# Patient Record
Sex: MALE | Race: WHITE | NOT HISPANIC OR LATINO
[De-identification: names, ages, dates, MRNs, and addresses within clinical notes are randomized per-mention and may not be internally consistent; named-entity substitution may affect disease eponyms.]

---

## 2017-01-23 ENCOUNTER — APPOINTMENT (OUTPATIENT)
Dept: ORTHOPEDIC SURGERY | Facility: CLINIC | Age: 57
End: 2017-01-23

## 2017-02-06 ENCOUNTER — APPOINTMENT (OUTPATIENT)
Dept: ORTHOPEDIC SURGERY | Facility: CLINIC | Age: 57
End: 2017-02-06

## 2017-02-06 VITALS — BODY MASS INDEX: 35 KG/M2 | HEIGHT: 71 IN | WEIGHT: 250 LBS

## 2017-02-06 DIAGNOSIS — S60.222A CONTUSION OF LEFT HAND, INITIAL ENCOUNTER: ICD-10-CM
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
## 2017-02-07 PROBLEM — S60.222A CONTUSION OF LEFT HAND, INITIAL ENCOUNTER: Status: ACTIVE | Noted: 2017-02-07

## 2017-02-15 ENCOUNTER — EMERGENCY (EMERGENCY)
Facility: HOSPITAL | Age: 57
LOS: 1 days | Discharge: HOME | End: 2017-02-15
Attending: EMERGENCY MEDICINE | Admitting: EMERGENCY MEDICINE
Payer: COMMERCIAL

## 2017-02-15 VITALS
OXYGEN SATURATION: 100 % | DIASTOLIC BLOOD PRESSURE: 80 MMHG | SYSTOLIC BLOOD PRESSURE: 145 MMHG | RESPIRATION RATE: 18 BRPM | HEART RATE: 76 BPM | TEMPERATURE: 98 F

## 2017-02-15 PROCEDURE — 99283 EMERGENCY DEPT VISIT LOW MDM: CPT

## 2017-02-15 NOTE — ED PROVIDER NOTE - OBJECTIVE STATEMENT
55 y/o male with PMHx of HTN presents to the ED c/o left hand pain and "finger locking" x 10 days with recent swelling last night and pain radiation up the arm. 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 Visited PMD 10 years ago, where he obtained XR and was RX-ed Abx and naproxen. Presents to the ED for persisting pain. Denies numbness, tingling, weakness, fever, chills, redness, discharge, and any other complaints. Smoker.

## 2017-02-15 NOTE — ED PROVIDER NOTE - MEDICAL DECISION MAKING DETAILS
Fb seen on outside XR of hand. No signs on infection. F/u with hand surgeon. Fb seen on outside XR of hand. No signs on infection, neurovascularly intact on exam. F/u with hand surgeon.

## 2017-02-15 NOTE — ED ADULT TRIAGE NOTE - CHIEF COMPLAINT QUOTE
Co left hand pain x 10 days. Went to urgent care and x-ray showed metal in hand and started on sulfamethoxazole. 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 Pt able to move hand but states sometimes the fingers lock. No swelling or redness noted.

## 2017-02-15 NOTE — ED PROVIDER NOTE - NS ED MD SCRIBE ATTENDING SCRIBE SECTIONS
DISPOSITION/HIV/REVIEW OF SYSTEMS/VITAL SIGNS( Pullset)/PHYSICAL EXAM/HISTORY OF PRESENT ILLNESS/PAST MEDICAL/SURGICAL/SOCIAL HISTORY

## 2017-02-15 NOTE — ED PROVIDER NOTE - UPPER EXTREMITY EXAM, LEFT
Pain with flection and extension of left thumb and index finger. Mild swelling of the dorsum at the second metacarpal. neurovascularly intact. No erythema. No warmth to touch.

## 2017-02-21 ENCOUNTER — APPOINTMENT (OUTPATIENT)
Dept: ORTHOPEDIC SURGERY | Facility: CLINIC | Age: 57
End: 2017-02-21

## 2017-02-23 ENCOUNTER — EMERGENCY (EMERGENCY)
Facility: HOSPITAL | Age: 57
LOS: 1 days | Discharge: HOME | End: 2017-02-23
Attending: EMERGENCY MEDICINE | Admitting: EMERGENCY MEDICINE
Payer: COMMERCIAL

## 2017-02-23 VITALS
OXYGEN SATURATION: 98 % | RESPIRATION RATE: 16 BRPM | TEMPERATURE: 98 F | SYSTOLIC BLOOD PRESSURE: 139 MMHG | HEART RATE: 67 BPM | DIASTOLIC BLOOD PRESSURE: 83 MMHG

## 2017-02-23 PROCEDURE — 99283 EMERGENCY DEPT VISIT LOW MDM: CPT

## 2017-02-23 NOTE — ED PROVIDER NOTE - NS ED MD SCRIBE ATTENDING SCRIBE SECTIONS
VITAL SIGNS( Pullset)/PHYSICAL EXAM/REVIEW OF SYSTEMS/HIV/DISPOSITION/HISTORY OF PRESENT ILLNESS/PAST MEDICAL/SURGICAL/SOCIAL HISTORY

## 2017-02-23 NOTE — ED PROVIDER NOTE - UPPER EXTREMITY EXAM, LEFT
slight swelling at the proximal area of the second interspace; nl strength/SWELLING slight swelling at the proximal area of the second interspace; nl strength, no erythema/SWELLING

## 2017-02-23 NOTE — ED PROVIDER NOTE - MEDICAL DECISION MAKING DETAILS
Known metal foreign body in L hand. Scheduled to see hand surgeon. Requires continued pain control. No signs of infection. Will Rx percocet and encourage outpatient f/u.

## 2017-02-23 NOTE — ED ADULT TRIAGE NOTE - CHIEF COMPLAINT QUOTE
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

## 2017-02-23 NOTE — ED PROVIDER NOTE - OBJECTIVE STATEMENT
57yo M with PMHx of hammer toe, HTN, arthritis, presents to ED c/o L hand pain/cramping/swelling for x8d, foreign body in L hand for unknown duration. Pt is a  and states he has baseline pain in his b/l shoulders, and a knee for which he has been taking naproxen. He has been having pain at the base of his L 2nd digit with movement. He noticed L hand swelling while having his knee evaluated, X-ray done showing metal in his hand. Pt was seen at [facility] ED x8d ago for pain, given referral to hand surgery for which he has an appointment in x2wks, Rx'd percocet for pain which he has recently ran out of. Pt has been unable to sleep due to pain prompting visit to ED. Denies other complaints. 55yo M with PMHx of hammertoe, HTN, arthritis, presents to ED c/o L hand pain/cramping/swelling for x8d, foreign body in L hand for unknown duration. Pt is a  and states he has baseline pain in his b/l shoulders, and a knee for which he has been taking naproxen. He has been having pain at the base of his L 2nd digit with movement. He noticed L hand swelling while having his knee evaluated, X-ray done showing metal in his hand. Pt was seen at [facility] ED x8d ago for pain, given referral to hand surgery for which he has an appointment in x2wks, Rx'd percocet for pain which he has recently ran out of. Pt has been unable to sleep due to pain prompting visit to ED. Denies other complaints.

## 2017-02-27 ENCOUNTER — APPOINTMENT (OUTPATIENT)
Dept: ORTHOPEDIC SURGERY | Facility: CLINIC | Age: 57
End: 2017-02-27

## 2017-03-02 ENCOUNTER — EMERGENCY (EMERGENCY)
Facility: HOSPITAL | Age: 57
LOS: 1 days | Discharge: HOME | End: 2017-03-02
Attending: EMERGENCY MEDICINE | Admitting: EMERGENCY MEDICINE
Payer: COMMERCIAL

## 2017-03-02 VITALS
HEART RATE: 75 BPM | SYSTOLIC BLOOD PRESSURE: 158 MMHG | TEMPERATURE: 98 F | DIASTOLIC BLOOD PRESSURE: 89 MMHG | RESPIRATION RATE: 16 BRPM | OXYGEN SATURATION: 100 %

## 2017-03-02 PROCEDURE — 99283 EMERGENCY DEPT VISIT LOW MDM: CPT | Mod: 25

## 2017-03-02 PROCEDURE — 29125 APPL SHORT ARM SPLINT STATIC: CPT | Mod: LT

## 2017-03-02 PROCEDURE — 73130 X-RAY EXAM OF HAND: CPT | Mod: 26,LT

## 2017-03-02 RX ORDER — KETOROLAC TROMETHAMINE 30 MG/ML
60 SYRINGE (ML) INJECTION ONCE
Qty: 0 | Refills: 0 | Status: DISCONTINUED | OUTPATIENT
Start: 2017-03-02 | End: 2017-03-02

## 2017-03-02 RX ORDER — IBUPROFEN 200 MG
1 TABLET ORAL
Qty: 30 | Refills: 0 | OUTPATIENT
Start: 2017-03-02

## 2017-03-02 RX ADMIN — Medication 60 MILLIGRAM(S): at 14:15

## 2017-03-02 RX ADMIN — Medication 60 MILLIGRAM(S): at 14:32

## 2017-03-02 NOTE — ED PROVIDER NOTE - NS ED MD SCRIBE ATTENDING SCRIBE SECTIONS
VITAL SIGNS( Pullset)/PHYSICAL EXAM/DISPOSITION/PAST MEDICAL/SURGICAL/SOCIAL HISTORY/REVIEW OF SYSTEMS/HIV/HISTORY OF PRESENT ILLNESS

## 2017-03-02 NOTE — ED PROVIDER NOTE - OBJECTIVE STATEMENT
56 year old male with past medical history of Hypertension, osteoarthritis presents to the Emergency Department complaining of progressively worsening left hand pain and swelling x1 month. Patient has known hardware in his left 3rd finger. Pain is stabbing in nature. Was previously seen 2 weeks ago for symptoms and was discharged home with Percocet which he has been taking with relief. Has appointment with had surgery next Wednesday, but presents to the Emergency Department today for worsening pain.  Denies fever, chills, numbness, tingling, weakness, or any other complaints.

## 2017-03-02 NOTE — ED PROVIDER NOTE - CARE PLAN
Principal Discharge DX:	Foreign body (FB) in soft tissue  Instructions for follow-up, activity and diet:	See your primary care doctor within 24-48 hours. Follow up with hand this week for further evaluation, bring copies of all reports with you. Take Motrin 600mg every 8hrs with food for pain.  Percocet 1 tablets every 6 hrs as needed for breakthrough pain-caution drowsiness while taking this medication- do not drive or operate heavy machinery. Keep splint on for comfort- cover with a plastic bag when showering. Return to the ER for worsening symptoms or any other concerns.

## 2017-03-02 NOTE — ED ADULT TRIAGE NOTE - CHIEF COMPLAINT QUOTE
Patient is a  with foreign body near second digit. Seen in ED 2/23/17 for same and given list of hand surgeons. However, patient  unable to get appointment till 3/8. Here today due to worsening pain to area. No erythema or swelling noted to area.

## 2017-03-02 NOTE — ED PROCEDURE NOTE - NS ED PERI VASCULAR NEG
no paresthesia/no swelling/capillary refill time < 2 seconds/no cyanosis of extremity/fingers/toes warm to touch

## 2017-03-02 NOTE — ED PROCEDURE NOTE - CPROC ED POST PROC CARE GUIDE1
Instructed patient/caregiver to follow-up with primary care physician./Instructed patient/caregiver regarding signs and symptoms of infection./Verbal/written post procedure instructions were given to patient/caregiver./Elevate the injured extremity as instructed.

## 2017-03-08 ENCOUNTER — APPOINTMENT (OUTPATIENT)
Dept: ORTHOPEDIC SURGERY | Facility: CLINIC | Age: 57
End: 2017-03-08

## 2017-03-08 DIAGNOSIS — S63.651D SPRAIN OF METACARPOPHALANGEAL JOINT OF LEFT INDEX FINGER, SUBSEQUENT ENCOUNTER: ICD-10-CM

## 2017-03-08 DIAGNOSIS — M65.332 TRIGGER FINGER, LEFT MIDDLE FINGER: ICD-10-CM

## 2017-03-08 RX ORDER — LOSARTAN POTASSIUM 50 MG/1
50 TABLET, FILM COATED ORAL
Qty: 30 | Refills: 0 | Status: ACTIVE | COMMUNITY
Start: 2017-02-01

## 2017-03-08 RX ORDER — OXYCODONE AND ACETAMINOPHEN 5; 325 MG/1; MG/1
5-325 TABLET ORAL
Qty: 12 | Refills: 0 | Status: ACTIVE | COMMUNITY
Start: 2017-03-02

## 2017-03-08 RX ORDER — IBUPROFEN 600 MG/1
600 TABLET, FILM COATED ORAL
Qty: 30 | Refills: 0 | Status: ACTIVE | COMMUNITY
Start: 2017-03-02

## 2017-03-08 RX ORDER — NAPROXEN 500 MG/1
500 TABLET ORAL
Qty: 60 | Refills: 0 | Status: ACTIVE | COMMUNITY
Start: 2017-02-10

## 2017-03-08 RX ORDER — SULFAMETHOXAZOLE AND TRIMETHOPRIM 800; 160 MG/1; MG/1
800-160 TABLET ORAL
Qty: 20 | Refills: 0 | Status: ACTIVE | COMMUNITY
Start: 2017-02-03

## 2017-05-11 ENCOUNTER — APPOINTMENT (OUTPATIENT)
Dept: ORTHOPEDIC SURGERY | Facility: CLINIC | Age: 57
End: 2017-05-11

## 2017-05-11 VITALS — WEIGHT: 250 LBS | BODY MASS INDEX: 35 KG/M2 | HEIGHT: 71 IN

## 2017-08-14 NOTE — ED PROVIDER NOTE - UPPER EXTREMITY EXAM, LEFT
negative Alert & oriented; no sensory, motor or coordination deficits, normal reflexes SWELLING/TENDERNESS/mild swelling in web space inbetween 2nd and 3rd digit, cap refill <2 seconds, full range of motion, strength 5/5, pain exacerbated with range of motion

## 2017-09-22 ENCOUNTER — APPOINTMENT (OUTPATIENT)
Dept: ORTHOPEDIC SURGERY | Facility: CLINIC | Age: 57
End: 2017-09-22
Payer: COMMERCIAL

## 2017-09-22 PROCEDURE — 73030 X-RAY EXAM OF SHOULDER: CPT | Mod: LT

## 2017-09-22 PROCEDURE — 99214 OFFICE O/P EST MOD 30 MIN: CPT

## 2017-10-30 ENCOUNTER — APPOINTMENT (OUTPATIENT)
Dept: ORTHOPEDIC SURGERY | Facility: CLINIC | Age: 57
End: 2017-10-30
Payer: COMMERCIAL

## 2017-10-30 PROCEDURE — 99214 OFFICE O/P EST MOD 30 MIN: CPT

## 2017-12-11 ENCOUNTER — OUTPATIENT (OUTPATIENT)
Dept: OUTPATIENT SERVICES | Facility: HOSPITAL | Age: 57
LOS: 1 days | End: 2017-12-11
Payer: COMMERCIAL

## 2017-12-11 VITALS
OXYGEN SATURATION: 97 % | DIASTOLIC BLOOD PRESSURE: 84 MMHG | HEIGHT: 69.5 IN | WEIGHT: 261.91 LBS | RESPIRATION RATE: 18 BRPM | TEMPERATURE: 98 F | HEART RATE: 74 BPM | SYSTOLIC BLOOD PRESSURE: 134 MMHG

## 2017-12-11 DIAGNOSIS — M67.919 UNSPECIFIED DISORDER OF SYNOVIUM AND TENDON, UNSPECIFIED SHOULDER: ICD-10-CM

## 2017-12-11 DIAGNOSIS — M75.122 COMPLETE ROTATOR CUFF TEAR OR RUPTURE OF LEFT SHOULDER, NOT SPECIFIED AS TRAUMATIC: ICD-10-CM

## 2017-12-11 DIAGNOSIS — Z98.890 OTHER SPECIFIED POSTPROCEDURAL STATES: Chronic | ICD-10-CM

## 2017-12-11 DIAGNOSIS — E66.9 OBESITY, UNSPECIFIED: ICD-10-CM

## 2017-12-11 LAB
BUN SERPL-MCNC: 13 MG/DL — SIGNIFICANT CHANGE UP (ref 7–23)
CALCIUM SERPL-MCNC: 9.3 MG/DL — SIGNIFICANT CHANGE UP (ref 8.4–10.5)
CHLORIDE SERPL-SCNC: 101 MMOL/L — SIGNIFICANT CHANGE UP (ref 98–107)
CO2 SERPL-SCNC: 28 MMOL/L — SIGNIFICANT CHANGE UP (ref 22–31)
CREAT SERPL-MCNC: 0.78 MG/DL — SIGNIFICANT CHANGE UP (ref 0.5–1.3)
GLUCOSE SERPL-MCNC: 64 MG/DL — LOW (ref 70–99)
HCT VFR BLD CALC: 45.4 % — SIGNIFICANT CHANGE UP (ref 39–50)
HGB BLD-MCNC: 15.2 G/DL — SIGNIFICANT CHANGE UP (ref 13–17)
MCHC RBC-ENTMCNC: 29.2 PG — SIGNIFICANT CHANGE UP (ref 27–34)
MCHC RBC-ENTMCNC: 33.5 % — SIGNIFICANT CHANGE UP (ref 32–36)
MCV RBC AUTO: 87.3 FL — SIGNIFICANT CHANGE UP (ref 80–100)
NRBC # FLD: 0 — SIGNIFICANT CHANGE UP
PLATELET # BLD AUTO: 255 K/UL — SIGNIFICANT CHANGE UP (ref 150–400)
PMV BLD: 11.3 FL — SIGNIFICANT CHANGE UP (ref 7–13)
POTASSIUM SERPL-MCNC: 4.5 MMOL/L — SIGNIFICANT CHANGE UP (ref 3.5–5.3)
POTASSIUM SERPL-SCNC: 4.5 MMOL/L — SIGNIFICANT CHANGE UP (ref 3.5–5.3)
RBC # BLD: 5.2 M/UL — SIGNIFICANT CHANGE UP (ref 4.2–5.8)
RBC # FLD: 12.5 % — SIGNIFICANT CHANGE UP (ref 10.3–14.5)
SODIUM SERPL-SCNC: 141 MMOL/L — SIGNIFICANT CHANGE UP (ref 135–145)
WBC # BLD: 7.44 K/UL — SIGNIFICANT CHANGE UP (ref 3.8–10.5)
WBC # FLD AUTO: 7.44 K/UL — SIGNIFICANT CHANGE UP (ref 3.8–10.5)

## 2017-12-11 PROCEDURE — 93010 ELECTROCARDIOGRAM REPORT: CPT

## 2017-12-11 NOTE — H&P PST ADULT - MALLAMPATI CLASS
Class III - visualization of the soft palate and the base of the uvula Class IV (difficult) - the soft palate is not visible at all/uvula not visualized

## 2017-12-11 NOTE — H&P PST ADULT - PMH
Arthritis    Hammer toe    HTN (hypertension) Arthritis    Hammer toe    HTN (hypertension)    Obesity Arthritis    Asthma    Hammer toe    HTN (hypertension)    Obesity

## 2017-12-11 NOTE — H&P PST ADULT - ASSESSMENT
Scheduled for Exploration resection soft tissue mass with graft right foot split thickness, autograft to right foot and vac dressing on 12/15/17. 58 y/o male with hx of left shoulder pain x several years, recently worsening.  Dx with rotator cuff tear, impingement syndrome of left shoulder scheduled for left shoulder major debridement, subacromial decompression/ acromioplasty 12/15/17.

## 2017-12-11 NOTE — H&P PST ADULT - MUSCULOSKELETAL COMMENTS
hx of left shoulder pain x several years, recently worsening.  Dx with rotator cuff tear, impingement syndrome of left shoulder scheduled for left shoulder major debridement, subacromial decompression/ acromioplasty 12/15/17.

## 2017-12-11 NOTE — H&P PST ADULT - NEGATIVE CARDIOVASCULAR SYMPTOMS
no dyspnea on exertion/no peripheral edema/no claudication/no chest pain/no orthopnea/no paroxysmal nocturnal dyspnea/no palpitations

## 2017-12-11 NOTE — H&P PST ADULT - NEGATIVE RESPIRATORY AND THORAX SYMPTOMS
no hemoptysis/no pleuritic chest pain/no dyspnea/no cough no dyspnea/no hemoptysis/no pleuritic chest pain

## 2017-12-11 NOTE — H&P PST ADULT - PROBLEM SELECTOR PLAN 1
Left shoulder major debridement, subacromial decompression/ acromioplasty 12/15/17.     CBC BMP EKG    Antibacterial soap given and explained  Pt has appt to see PMD today for evaluation

## 2017-12-15 ENCOUNTER — APPOINTMENT (OUTPATIENT)
Dept: ORTHOPEDIC SURGERY | Facility: AMBULATORY SURGERY CENTER | Age: 57
End: 2017-12-15

## 2017-12-15 ENCOUNTER — OUTPATIENT (OUTPATIENT)
Dept: OUTPATIENT SERVICES | Facility: HOSPITAL | Age: 57
LOS: 1 days | Discharge: HOME | End: 2017-12-15
Payer: COMMERCIAL

## 2017-12-15 VITALS
HEART RATE: 80 BPM | RESPIRATION RATE: 20 BRPM | TEMPERATURE: 98 F | DIASTOLIC BLOOD PRESSURE: 69 MMHG | OXYGEN SATURATION: 96 % | SYSTOLIC BLOOD PRESSURE: 134 MMHG

## 2017-12-15 VITALS
SYSTOLIC BLOOD PRESSURE: 117 MMHG | WEIGHT: 262.35 LBS | HEIGHT: 69.5 IN | TEMPERATURE: 98 F | HEART RATE: 79 BPM | RESPIRATION RATE: 16 BRPM | OXYGEN SATURATION: 100 % | DIASTOLIC BLOOD PRESSURE: 61 MMHG

## 2017-12-15 DIAGNOSIS — M75.122 COMPLETE ROTATOR CUFF TEAR OR RUPTURE OF LEFT SHOULDER, NOT SPECIFIED AS TRAUMATIC: ICD-10-CM

## 2017-12-15 DIAGNOSIS — Z98.890 OTHER SPECIFIED POSTPROCEDURAL STATES: Chronic | ICD-10-CM

## 2017-12-15 PROCEDURE — 29827 SHO ARTHRS SRG RT8TR CUF RPR: CPT | Mod: LT

## 2017-12-15 PROCEDURE — 23405 INCISION OF TENDON & MUSCLE: CPT | Mod: LT

## 2017-12-15 PROCEDURE — 29824 SHO ARTHRS SRG DSTL CLAVICLC: CPT | Mod: LT

## 2017-12-15 PROCEDURE — 29826 SHO ARTHRS SRG DECOMPRESSION: CPT | Mod: LT

## 2017-12-15 PROCEDURE — 29823 SHO ARTHRS SRG XTNSV DBRDMT: CPT | Mod: LT

## 2017-12-15 NOTE — ASU PREOPERATIVE ASSESSMENT, ADULT (IPARK ONLY) - TEACHING/LEARNING LEARNING PREFERENCES
Outpatient Physical Therapy Neuro Initial Evaluation  [facility]     Patient Name: Rashad Carrillo  : 1957  MRN: [id number]  Today's Date: 2017      Visit Date: 2017    Patient Active Problem List   Diagnosis   • Vitamin D deficiency   • Depression   • Multiple sclerosis   • Periodic limb movement disorder   • Restless legs syndrome   • Muscle spasticity   • Periodic headache syndrome, not intractable        Past Medical History:   Diagnosis Date   • Hypertension    • Multiple sclerosis         No past surgical history on file.      Visit Dx:     ICD-10-CM ICD-9-CM   1. Multiple sclerosis G35 340             Patient History       17 1100          History    Chief Complaint Balance Problems;Difficulty Walking;Difficulty with daily activities;Dizziness;Falls/history of falls;Fatigue/poor endurance;Impaired sensation;Muscle weakness;Numbness  -      Date Current Problem(s) Began 17  -      Brief Description of Current Complaint Pt presents to clinic with complaints related to MS. She reports she falls everyday and has seriously injured herself. She uses 1-2 trekking poles for ambulation and does not own a walker. She recently had a pelvic exam and her MD stated she had very weak pelvic muscles, along with incontinence. She states that she walks everyday and does stretches everyday. Pt has random numbness and tingling throughout the day on the L side but most is at night. Pt realizes she needs to do something in order to prevent falls everyday.   -KL      Current Tobacco Use no  -KL      Smoking Status no  -KL      Pain     Pain at Present 0  -KL      Pain at Best 0  -KL      Pain at Worst 0  -KL      Fall Risk Assessment    Any falls in the past year: Yes  -KL      Number of falls reported in the last 12 months everyday  -KL      Factors that contributed to the fall: Lost balance;Fatigue;Tripped;Didn't use assistive device;Uneven surface  -KL      Does patient have a fear of  falling Yes (comment)  -KL      Daily Activities    Primary Language English  -KL      Are you able to read Yes  -KL      Are you able to write Yes  -KL      Recommended Referrals Occupational Therapy  -KL      Pt Participated in POC and Goals Yes  -KL      Safety    Are you being hurt, hit, or frightened by anyone at home or in your life? No  -KL      Are you being neglected by a caregiver No  -KL        User Key  (r) = Recorded By, (t) = Taken By, (c) = Cosigned By    Initials Name Provider Type    STANTON Aguilar PT Physical Therapist                    PT Neuro       05/31/17 1100          Home Living    Living Arrangements house  -KL      Home Accessibility stairs to enter home  -KL      Number of Stairs to Enter Home 2  -KL      Home Equipment Cane   trekking poles  -KL      Vision-Basic Assessment    Current Vision Wears glasses only for reading  -KL      Cognition    Overall Cognitive Status Impaired  -KL      Safety Judgment Decreased awareness of need for safety  -KL      Deficits Decreased awareness of deficits  -KL      Sensation    Light Touch Partial deficits in the LUE;Partial deficits in the LLE  -KL      Coordination    Coordination Tests Rapid Alternating  -KL      Rapid Alternating Left:;Impaired  -KL      ROM (Range of Motion)    General ROM no range of motion deficits identified  -KL      MMT (Manual Muscle Testing)    General MMT Assessment lower extremity strength deficits identified  -KL      Left Hip    Hip Flexion Gross Movement (2-/5) poor minus  -KL      Hip Extension Gross Movement --   could not tolerate position - most likely < 3/5  -KL      Hip ABduction Gross Movement (2/5) poor  -KL      Hip ADduction Gross Movement --   could not tolerate position - most likely < 3/5  -KL      Right Hip    Hip Flexion Gross Movement (4-/5) good minus  -KL      Hip Extension Gross Movement --   could not tolerate position - most likely < 3/5  -KL      Hip ABduction Gross Movement (4-/5) good  minus  -KL      Hip ADduction Gross Movement --   could not tolerate position - most likely < 3/5  -KL      Left Knee    Knee Extension Gross Movement (3/5) fair  -KL      Knee Flexion Gross Movement (3-/5) fair minus  -KL      Right Knee    Knee Extension Gross Movement (4/5) good  -KL      Knee Flexion Gross Movement (4/5) good  -KL      Left Ankle/Foot    Ankle Dorsiflexion Gross Movement (4-/5) good minus  -KL      Subtalar Inversion Gross Movement (4/5) good  -KL      Subtalar Eversion Gross Movement (3-/5) fair minus  -KL      Right Ankle/Foot    Ankle Dorsiflexion Gross Movement (4/5) good  -KL      Subtalar Inversion Gross Movement (4/5) good  -KL      Subtalar Eversion Gross Movement (4/5) good  -KL      Bed Mobility, Assessment/Treatment    Bed Mobility, Comment Pt able to roll to both sides, however she is slow. Bridging and scooting are impaired  -KL      Transfers    Transfer, Comment Pt must use UE's for sit to stand and stand to sit. She also attains and extremely wide CARLOS in order to stand.   -KL      Gait Assessment/Treatment    Gait, Comment Pt uses trekking pole for ambulation; she ambulates with a wide base gait, decreased step length and partial dragging of the L foot. She demo's increased unsteadiness and increased lateral trunk movement with steps.   -KL        User Key  (r) = Recorded By, (t) = Taken By, (c) = Cosigned By    Initials Name Provider Type    STANTON Aguilar PT Physical Therapist                        Therapy Education       06/01/17 0949          Therapy Education    Given Symptoms/condition management;Fall prevention and home safety;Mobility training  -KL      Program New  -KL      How Provided Verbal  -KL      Provided to Patient  -KL      Level of Understanding Verbalized  -KL        User Key  (r) = Recorded By, (t) = Taken By, (c) = Cosigned By    Initials Name Provider Type    STANTON Aguilar PT Physical Therapist                PT OP Goals       05/31/17  1100       PT Short Term Goals    STG Date to Achieve 06/29/17  -KL     STG 1 Pt will be compliant with HEP.   -KL     STG 1 Progress New  -KL     STG 2 Patient to improve GRIMALDO balance score to >/= 20/56 to decrease client's risk of falls.  -KL     STG 2 Progress New  -KL     STG 3 Patient to ambulate 25 feet within 14 sec without LOB at a regular pace for improved gait cinthia.  -KL     STG 3 Progress New  -KL     STG 4 Patient to perform TUG within 27 sec without LOB for improved functional mobility.  -KL     STG 4 Progress New  -KL     Long Term Goals    LTG Date to Achieve 07/27/17  -KL     LTG 1 Pt will be I with HEP.   -KL     LTG 1 Progress New  -KL     LTG 2 Patient to improve GRIMALDO balance score to >/= 24/56 to decrease client's risk of falls.  -KL     LTG 2 Progress New  -KL     LTG 3 Patient to ambulate 25 feet within 12 sec without LOB at a regular pace for improved gait cinthia.  -KL     LTG 3 Progress New  -KL     LTG 4 Patient to perform TUG within 22 sec without LOB for improved functional mobility.  -KL     LTG 4 Progress New  -KL     Time Calculation    PT Goal Re-Cert Due Date 06/29/17  -       User Key  (r) = Recorded By, (t) = Taken By, (c) = Cosigned By    Initials Name Provider Type    STANTON Aguilar, PT Physical Therapist                PT Assessment/Plan       05/31/17 1100       PT Assessment    Functional Limitations Decreased safety during functional activities;Impaired gait;Limitation in home management;Limitations in community activities;Performance in work activities;Performance in self-care ADL;Performance in leisure activities  -     Impairments Balance;Cognition;Coordination;Gait;Muscle strength;Sensation  -     Assessment Comments Patient demonstrates global LE and core weakness, along with severely impaired balance and antalgic gait. She is a high fall risk and has severely impaired safety awareness at home, thus falling everyday. Patient will require skilled PT  intervention to address deficits in order to improve QOL, safety and balance.   -     Please refer to paper survey for additional self-reported information Yes  -KL     Rehab Potential Fair  -KL     Patient/caregiver participated in establishment of treatment plan and goals Yes  -KL     Patient would benefit from skilled therapy intervention Yes  -KL     PT Plan    PT Frequency 1x/week  -KL     Predicted Duration of Therapy Intervention (days/wks) x 8 wks   -KL     Planned CPT's? PT EVAL HIGH COMPLEXITY: 37826;PT THER PROC EA 15 MIN: 04159;PT GAIT TRAINING EA 15 MIN: 15095;PT NEUROMUSC RE-EDUCATION EA 15 MIN: 96188;PT MANUAL THERAPY EA 15 MIN: 22259;PT THER ACT EA 15 MIN: 80272;PT ELECTRICAL STIM UNATTEND: ;PT HOT/COLD PACK WC NONMCARE: 71885  -     PT Plan Comments Patient will be seen 1x/wk x 8 wks with treatment to include strengthening, stretching, manual therapy, neuromuscular re-education, balance, gait and endurance training in order to improve safety and decrease falls. OT order will be requested.   -       User Key  (r) = Recorded By, (t) = Taken By, (c) = Cosigned By    Initials Name Provider Type    STANTON Aguilar, PT Physical Therapist                                   Outcome Measures       05/31/17 1100          25 Foot Walk    Walk #1 16.87 seconds  -KL      Hammonds Balance Scale    Sitting to Standing 2  -KL      Standing Unsupported 2  -KL      Sitting with Back Unsupported but Feet Supported on Floor or on Stool 4  -KL      Standing to Sitting 2  -KL      Transfers 2  -KL      Standing Unsupported with Eyes Closed 0  -KL      Standing Unsupported with Feet Together 0  -KL      Reaching Forward with Outstretched Arm While Standing 3  -KL       Object From the Floor From a Standing Position 0  -KL      Turning to Look Behind Over Left and Right Shoulders While Standing 1  -KL      Turn 360 Degrees 0  -KL      Place Alternate Foot on Step or Stool While Standing Unsupported 0   -KL      Standing Unsupported with One Foot in Front 0  -KL      Standing on One Leg 0  -KL      Hammonds Total Score 16  -KL      Timed Up and Go (TUG)    TUG Test 1 32.1 seconds  -      Functional Assessment    Outcome Measure Options 25 Foot Walk;Hammonds Balance;Timed Up and Go (TUG)  -        User Key  (r) = Recorded By, (t) = Taken By, (c) = Cosigned By    Initials Name Provider Type     Kat Aguilar, PT Physical Therapist          Time Calculation:   Start Time: 1100     Therapy Charges for Today     Code Description Service Date Service Provider Modifiers Qty    [id number]  PT EVAL HIGH COMPLEXITY 4 5/31/2017 Kat Aguilar, PT GP 1          PT G-Codes  Outcome Measure Options: 25 Foot Walk, Hammonds Balance, Timed Up and Go (TUG)         Kat Aguilar, PT  6/1/2017      verbal instruction/individual instruction

## 2017-12-15 NOTE — ASU DISCHARGE PLAN (ADULT/PEDIATRIC). - YOU WERE IN THE HOSPITAL FOR:
left shoulder arthroscopy with rotator cuff repair, subscapularis repair, with biceps tenotomy and distal clavicle excision

## 2017-12-15 NOTE — ASU DISCHARGE PLAN (ADULT/PEDIATRIC). - NOTIFY
Inability to Tolerate Liquids or Foods/Bleeding that does not stop/Fever greater than 101/Persistent Nausea and Vomiting

## 2017-12-19 ENCOUNTER — TRANSCRIPTION ENCOUNTER (OUTPATIENT)
Age: 57
End: 2017-12-19

## 2017-12-27 ENCOUNTER — APPOINTMENT (OUTPATIENT)
Dept: ORTHOPEDIC SURGERY | Facility: CLINIC | Age: 57
End: 2017-12-27
Payer: COMMERCIAL

## 2017-12-27 VITALS
HEIGHT: 71 IN | HEART RATE: 82 BPM | SYSTOLIC BLOOD PRESSURE: 163 MMHG | BODY MASS INDEX: 35 KG/M2 | WEIGHT: 250 LBS | DIASTOLIC BLOOD PRESSURE: 99 MMHG

## 2017-12-27 DIAGNOSIS — Z98.890 OTHER SPECIFIED POSTPROCEDURAL STATES: ICD-10-CM

## 2017-12-27 PROCEDURE — 99024 POSTOP FOLLOW-UP VISIT: CPT

## 2018-01-03 ENCOUNTER — APPOINTMENT (OUTPATIENT)
Dept: ORTHOPEDIC SURGERY | Facility: CLINIC | Age: 58
End: 2018-01-03
Payer: COMMERCIAL

## 2018-01-03 PROCEDURE — 99024 POSTOP FOLLOW-UP VISIT: CPT

## 2018-01-03 RX ORDER — OXYCODONE AND ACETAMINOPHEN 5; 325 MG/1; MG/1
5-325 TABLET ORAL EVERY 4 HOURS
Qty: 40 | Refills: 0 | Status: DISCONTINUED | COMMUNITY
Start: 2017-12-14 | End: 2018-01-03

## 2018-01-03 RX ORDER — OXYCODONE AND ACETAMINOPHEN 5; 325 MG/1; MG/1
5-325 TABLET ORAL
Qty: 50 | Refills: 0 | Status: DISCONTINUED | COMMUNITY
Start: 2018-01-03 | End: 2018-01-03

## 2018-01-05 ENCOUNTER — OTHER (OUTPATIENT)
Age: 58
End: 2018-01-05

## 2018-01-16 ENCOUNTER — RX RENEWAL (OUTPATIENT)
Age: 58
End: 2018-01-16

## 2018-02-01 ENCOUNTER — APPOINTMENT (OUTPATIENT)
Dept: ORTHOPEDIC SURGERY | Facility: CLINIC | Age: 58
End: 2018-02-01
Payer: COMMERCIAL

## 2018-02-01 PROCEDURE — 99024 POSTOP FOLLOW-UP VISIT: CPT

## 2018-02-08 ENCOUNTER — RX RENEWAL (OUTPATIENT)
Age: 58
End: 2018-02-08

## 2018-02-08 RX ORDER — OXYCODONE AND ACETAMINOPHEN 5; 325 MG/1; MG/1
5-325 TABLET ORAL
Qty: 30 | Refills: 0 | Status: ACTIVE | COMMUNITY
Start: 2018-01-05 | End: 1900-01-01

## 2018-03-08 ENCOUNTER — APPOINTMENT (OUTPATIENT)
Dept: ORTHOPEDIC SURGERY | Facility: CLINIC | Age: 58
End: 2018-03-08
Payer: COMMERCIAL

## 2018-03-08 DIAGNOSIS — M25.812 OTHER SPECIFIED JOINT DISORDERS, LEFT SHOULDER: ICD-10-CM

## 2018-03-08 PROCEDURE — 99024 POSTOP FOLLOW-UP VISIT: CPT

## 2018-04-16 ENCOUNTER — RX RENEWAL (OUTPATIENT)
Age: 58
End: 2018-04-16

## 2018-05-30 ENCOUNTER — APPOINTMENT (OUTPATIENT)
Dept: ORTHOPEDIC SURGERY | Facility: CLINIC | Age: 58
End: 2018-05-30
Payer: COMMERCIAL

## 2018-05-30 DIAGNOSIS — M75.42 IMPINGEMENT SYNDROME OF LEFT SHOULDER: ICD-10-CM

## 2018-05-30 DIAGNOSIS — M67.922 UNSPECIFIED DISORDER OF SYNOVIUM AND TENDON, LEFT UPPER ARM: ICD-10-CM

## 2018-05-30 PROCEDURE — 99213 OFFICE O/P EST LOW 20 MIN: CPT

## 2018-09-17 NOTE — ED PROVIDER NOTE - PROGRESS NOTE DETAILS
Strong peripheral pulses
KENDRA Jama: Received sign out from KENDRA ROYAL to reassess patient and follow up on xray results. Xray showing the same stable foreign body. Pt placed in a volar splint for comfort. RX for percocet and motrin printed since the e-prescription system is currently down. Pt seeing hand this week. Pt ok with outpatient follow up

## 2018-11-24 NOTE — ASU PREOPERATIVE ASSESSMENT, ADULT ([facility] ONLY) - PERSONAL BELONGINGS
Procedure    48 HR HOLTER:    INDICATION:  The patient is a 76 year-old male. He is here for evaluation of tachyarrhythmia .     DISCUSSION:  Baseline ECG: sinus rhythm with ventricular bigeminy   The patient underwent dynamic electrocardiography for 48 hours     The predominant mechanism was sinus . The patient's mean overall heart rate, including ectopy, was 72 beats/min and ranged from 50 beats/min to 110 beats/min.     Supraventricular ectopic events represented 10% of total beats analyzed. There were runs of supraventricular ectopy but the longest was 6 beats.     Ventricular ectopic events represented 11% of total beats analyzed. There were no runs of ventricular ectopy.     The longest R-R interval was 1.6 seconds.     Patient diary returned with symptom. This was not associated with significant dysrhythmia.     Signatures   Electronically signed by : ALEXANDRIA SPIVEY MD; May 12 2017 10:37AM CST    
lock

## 2018-12-10 ENCOUNTER — APPOINTMENT (OUTPATIENT)
Dept: ORTHOPEDIC SURGERY | Facility: CLINIC | Age: 58
End: 2018-12-10
Payer: COMMERCIAL

## 2018-12-10 VITALS
DIASTOLIC BLOOD PRESSURE: 84 MMHG | BODY MASS INDEX: 35 KG/M2 | HEIGHT: 71 IN | WEIGHT: 250 LBS | HEART RATE: 73 BPM | SYSTOLIC BLOOD PRESSURE: 148 MMHG

## 2018-12-10 DIAGNOSIS — M75.122 COMPLETE ROTATOR CUFF TEAR OR RUPTURE OF LEFT SHOULDER, NOT SPECIFIED AS TRAUMATIC: ICD-10-CM

## 2018-12-10 DIAGNOSIS — M75.101 UNSPECIFIED ROTATOR CUFF TEAR OR RUPTURE OF RIGHT SHOULDER, NOT SPECIFIED AS TRAUMATIC: ICD-10-CM

## 2018-12-10 DIAGNOSIS — M12.811 UNSPECIFIED ROTATOR CUFF TEAR OR RUPTURE OF RIGHT SHOULDER, NOT SPECIFIED AS TRAUMATIC: ICD-10-CM

## 2018-12-10 PROCEDURE — 99213 OFFICE O/P EST LOW 20 MIN: CPT

## 2018-12-19 PROBLEM — E66.9 OBESITY, UNSPECIFIED: Chronic | Status: ACTIVE | Noted: 2017-12-11

## 2018-12-19 PROBLEM — M19.90 UNSPECIFIED OSTEOARTHRITIS, UNSPECIFIED SITE: Chronic | Status: ACTIVE | Noted: 2017-02-23

## 2018-12-19 PROBLEM — I10 ESSENTIAL (PRIMARY) HYPERTENSION: Chronic | Status: ACTIVE | Noted: 2017-02-15

## 2019-01-25 ENCOUNTER — APPOINTMENT (OUTPATIENT)
Dept: ORTHOPEDIC SURGERY | Facility: CLINIC | Age: 59
End: 2019-01-25
Payer: COMMERCIAL

## 2019-01-25 VITALS
SYSTOLIC BLOOD PRESSURE: 150 MMHG | DIASTOLIC BLOOD PRESSURE: 93 MMHG | HEIGHT: 70 IN | HEART RATE: 68 BPM | WEIGHT: 260 LBS | BODY MASS INDEX: 37.22 KG/M2

## 2019-01-25 DIAGNOSIS — R22.32 LOCALIZED SWELLING, MASS AND LUMP, LEFT UPPER LIMB: ICD-10-CM

## 2019-01-25 PROCEDURE — 99214 OFFICE O/P EST MOD 30 MIN: CPT

## 2019-02-22 ENCOUNTER — OUTPATIENT (OUTPATIENT)
Dept: OUTPATIENT SERVICES | Facility: HOSPITAL | Age: 59
LOS: 1 days | End: 2019-02-22
Payer: COMMERCIAL

## 2019-02-22 VITALS
HEART RATE: 76 BPM | TEMPERATURE: 99 F | HEIGHT: 71 IN | DIASTOLIC BLOOD PRESSURE: 77 MMHG | OXYGEN SATURATION: 96 % | WEIGHT: 259.93 LBS | RESPIRATION RATE: 16 BRPM | SYSTOLIC BLOOD PRESSURE: 110 MMHG

## 2019-02-22 DIAGNOSIS — I10 ESSENTIAL (PRIMARY) HYPERTENSION: ICD-10-CM

## 2019-02-22 DIAGNOSIS — M65.342 TRIGGER FINGER, LEFT RING FINGER: ICD-10-CM

## 2019-02-22 DIAGNOSIS — Z01.818 ENCOUNTER FOR OTHER PREPROCEDURAL EXAMINATION: ICD-10-CM

## 2019-02-22 DIAGNOSIS — Z98.890 OTHER SPECIFIED POSTPROCEDURAL STATES: Chronic | ICD-10-CM

## 2019-02-22 DIAGNOSIS — M72.0 PALMAR FASCIAL FIBROMATOSIS [DUPUYTREN]: ICD-10-CM

## 2019-02-22 DIAGNOSIS — S60.511A ABRASION OF RIGHT HAND, INITIAL ENCOUNTER: Chronic | ICD-10-CM

## 2019-02-22 DIAGNOSIS — M65.352 TRIGGER FINGER, LEFT LITTLE FINGER: ICD-10-CM

## 2019-02-22 DIAGNOSIS — J45.20 MILD INTERMITTENT ASTHMA, UNCOMPLICATED: ICD-10-CM

## 2019-02-22 DIAGNOSIS — R22.32 LOCALIZED SWELLING, MASS AND LUMP, LEFT UPPER LIMB: Chronic | ICD-10-CM

## 2019-02-22 DIAGNOSIS — G47.33 OBSTRUCTIVE SLEEP APNEA (ADULT) (PEDIATRIC): ICD-10-CM

## 2019-02-22 DIAGNOSIS — R22.32 LOCALIZED SWELLING, MASS AND LUMP, LEFT UPPER LIMB: ICD-10-CM

## 2019-02-22 PROCEDURE — G0463: CPT

## 2019-02-22 RX ORDER — LIDOCAINE HCL 20 MG/ML
0.2 VIAL (ML) INJECTION ONCE
Qty: 0 | Refills: 0 | Status: DISCONTINUED | OUTPATIENT
Start: 2019-03-01 | End: 2019-03-16

## 2019-02-22 RX ORDER — SODIUM CHLORIDE 9 MG/ML
3 INJECTION INTRAMUSCULAR; INTRAVENOUS; SUBCUTANEOUS EVERY 8 HOURS
Qty: 0 | Refills: 0 | Status: DISCONTINUED | OUTPATIENT
Start: 2019-03-01 | End: 2019-03-16

## 2019-02-22 NOTE — H&P PST ADULT - PMH
Arthritis    Asthma    Hammer toe    HTN (hypertension)    Obesity Arthritis    Asthma  Pt reports he has never been hospitalized for asthma, he used inhaler 1 yr ago  Bunion    Hammer toe    Hip pain, chronic, right    HTN (hypertension)    Localized swelling, mass and lump, left upper limb    Lumbar back pain    Obesity    Palmar fascial fibromatosis [dupuytren]    Rotator cuff disorder, left  s/p surgery 2017  Trigger finger, left little finger

## 2019-02-22 NOTE — H&P PST ADULT - PSH
bunionectomy  b/L 2007  H/O carpal tunnel repair  and trigger finger repair (L) 5/2017 Abrasion of right hand, initial encounter  s/p excison of glass right hand  bunionectomy  b/L 2007  H/O carpal tunnel repair  and trigger finger repair (L) 5/2017  S/P rotator cuff surgery  2017  S/P tendon repair  nerve and tendon repair left hand

## 2019-02-22 NOTE — H&P PST ADULT - HISTORY OF PRESENT ILLNESS
59 y/o male H/O HTN. C/O pain left small finger and ring finger, Stiffness left ring finger .  Seen by MD. Presents for left small finger mass excison,l left ring finger trigger release w/Dupuytren's nodule excision 3/1/19. 57 y/o male H/O HTN, obesity, Asthma. C/O pain left small finger and left ring finger, Stiffness left ring finger, lump left small finger and left hand.  Seen by MD. Presents for left small finger mass excision, left ring finger trigger release w/Dupuytren's nodule excision 3/1/19.

## 2019-02-22 NOTE — H&P PST ADULT - MALLAMPATI CLASS
Class IV (difficult) - the soft palate is not visible at all/uvula not visualized Class IV (difficult) - the soft palate is not visible at all

## 2019-02-23 PROBLEM — J45.909 UNSPECIFIED ASTHMA, UNCOMPLICATED: Chronic | Status: ACTIVE | Noted: 2017-12-11

## 2019-02-28 ENCOUNTER — TRANSCRIPTION ENCOUNTER (OUTPATIENT)
Age: 59
End: 2019-02-28

## 2019-03-01 ENCOUNTER — APPOINTMENT (OUTPATIENT)
Dept: ORTHOPEDIC SURGERY | Facility: HOSPITAL | Age: 59
End: 2019-03-01

## 2019-03-01 ENCOUNTER — OUTPATIENT (OUTPATIENT)
Dept: OUTPATIENT SERVICES | Facility: HOSPITAL | Age: 59
LOS: 1 days | End: 2019-03-01
Payer: MEDICARE

## 2019-03-01 ENCOUNTER — RESULT REVIEW (OUTPATIENT)
Age: 59
End: 2019-03-01

## 2019-03-01 VITALS
OXYGEN SATURATION: 98 % | HEIGHT: 71 IN | SYSTOLIC BLOOD PRESSURE: 143 MMHG | WEIGHT: 259.93 LBS | DIASTOLIC BLOOD PRESSURE: 80 MMHG | HEART RATE: 80 BPM | TEMPERATURE: 99 F | RESPIRATION RATE: 16 BRPM

## 2019-03-01 VITALS
OXYGEN SATURATION: 95 % | RESPIRATION RATE: 16 BRPM | HEART RATE: 80 BPM | SYSTOLIC BLOOD PRESSURE: 126 MMHG | DIASTOLIC BLOOD PRESSURE: 70 MMHG

## 2019-03-01 DIAGNOSIS — S60.511A ABRASION OF RIGHT HAND, INITIAL ENCOUNTER: Chronic | ICD-10-CM

## 2019-03-01 DIAGNOSIS — R22.32 LOCALIZED SWELLING, MASS AND LUMP, LEFT UPPER LIMB: ICD-10-CM

## 2019-03-01 DIAGNOSIS — Z98.890 OTHER SPECIFIED POSTPROCEDURAL STATES: Chronic | ICD-10-CM

## 2019-03-01 DIAGNOSIS — Z01.818 ENCOUNTER FOR OTHER PREPROCEDURAL EXAMINATION: ICD-10-CM

## 2019-03-01 DIAGNOSIS — M72.0 PALMAR FASCIAL FIBROMATOSIS [DUPUYTREN]: ICD-10-CM

## 2019-03-01 DIAGNOSIS — M65.342 TRIGGER FINGER, LEFT RING FINGER: ICD-10-CM

## 2019-03-01 PROCEDURE — 26055 INCISE FINGER TENDON SHEATH: CPT | Mod: 59,F3

## 2019-03-01 PROCEDURE — 26040 RELEASE PALM CONTRACTURE: CPT | Mod: F3

## 2019-03-01 PROCEDURE — 26116 EXC HAND TUM DEEP < 1.5 CM: CPT | Mod: F4

## 2019-03-01 PROCEDURE — 88304 TISSUE EXAM BY PATHOLOGIST: CPT | Mod: 26

## 2019-03-01 PROCEDURE — 26123 RELEASE PALM CONTRACTURE: CPT | Mod: LT

## 2019-03-01 PROCEDURE — 88304 TISSUE EXAM BY PATHOLOGIST: CPT

## 2019-03-01 PROCEDURE — 11422 EXC H-F-NK-SP B9+MARG 1.1-2: CPT

## 2019-03-01 RX ORDER — ONDANSETRON 8 MG/1
4 TABLET, FILM COATED ORAL ONCE
Qty: 0 | Refills: 0 | Status: COMPLETED | OUTPATIENT
Start: 2019-03-01 | End: 2019-03-01

## 2019-03-01 RX ORDER — HYDROMORPHONE HYDROCHLORIDE 2 MG/ML
0.25 INJECTION INTRAMUSCULAR; INTRAVENOUS; SUBCUTANEOUS
Qty: 0 | Refills: 0 | Status: DISCONTINUED | OUTPATIENT
Start: 2019-03-01 | End: 2019-03-01

## 2019-03-01 RX ORDER — SODIUM CHLORIDE 9 MG/ML
1000 INJECTION, SOLUTION INTRAVENOUS
Qty: 0 | Refills: 0 | Status: DISCONTINUED | OUTPATIENT
Start: 2019-03-01 | End: 2019-03-16

## 2019-03-01 RX ORDER — ACETAMINOPHEN 500 MG
1000 TABLET ORAL ONCE
Qty: 0 | Refills: 0 | Status: COMPLETED | OUTPATIENT
Start: 2019-03-01 | End: 2019-03-01

## 2019-03-01 RX ORDER — OXYCODONE HYDROCHLORIDE 5 MG/1
5 TABLET ORAL ONCE
Qty: 0 | Refills: 0 | Status: DISCONTINUED | OUTPATIENT
Start: 2019-03-01 | End: 2019-03-01

## 2019-03-01 RX ORDER — CELECOXIB 200 MG/1
200 CAPSULE ORAL ONCE
Qty: 0 | Refills: 0 | Status: COMPLETED | OUTPATIENT
Start: 2019-03-01 | End: 2019-03-01

## 2019-03-01 RX ADMIN — CELECOXIB 200 MILLIGRAM(S): 200 CAPSULE ORAL at 11:06

## 2019-03-01 RX ADMIN — OXYCODONE HYDROCHLORIDE 5 MILLIGRAM(S): 5 TABLET ORAL at 11:06

## 2019-03-01 RX ADMIN — ONDANSETRON 4 MILLIGRAM(S): 8 TABLET, FILM COATED ORAL at 11:06

## 2019-03-01 NOTE — ASU DISCHARGE PLAN (ADULT/PEDIATRIC). - NOTIFY
Numbness, color, or temperature change to extremity/Swelling that continues/Persistent Nausea and Vomiting/Fever greater than 101/Bleeding that does not stop/Pain not relieved by Medications

## 2019-03-01 NOTE — ASU PATIENT PROFILE, ADULT - PSH
Abrasion of right hand, initial encounter  s/p excison of glass right hand  bunionectomy  b/L 2007  H/O carpal tunnel repair  and trigger finger repair (L) 5/2017  S/P rotator cuff surgery  2017  S/P tendon repair  nerve and tendon repair left hand

## 2019-03-01 NOTE — ASU PATIENT PROFILE, ADULT - PMH
Arthritis    Asthma  Pt reports he has never been hospitalized for asthma, he used inhaler 1 yr ago  Bunion    Hammer toe    Hip pain, chronic, right    HTN (hypertension)    Localized swelling, mass and lump, left upper limb    Lumbar back pain    Obesity    Palmar fascial fibromatosis [dupuytren]    Rotator cuff disorder, left  s/p surgery 2017  Trigger finger, left little finger

## 2019-03-01 NOTE — BRIEF OPERATIVE NOTE - OPERATION/FINDINGS
See dictated report.  Findings - Triggering of L ring finger, Dupuytren nodule at palmar crease of 4th ray, mass over volar proximal phalanx of little finger  Procedure - Mass excision likely inclusion cyst from volar little finger proximal phalanx, release of A1 pulley ring finger, excision of Dupuytren nodule

## 2019-03-01 NOTE — BRIEF OPERATIVE NOTE - POST-OP DX
Mass of left hand  03/01/2019    Active  Jazmyn Arnold  Trigger finger, left ring finger  03/01/2019    Active  Jazmyn Arnold

## 2019-03-01 NOTE — BRIEF OPERATIVE NOTE - PROCEDURE
<<-----Click on this checkbox to enter Procedure Removal of mass of hand  03/01/2019    Active  [id number]  Trigger finger release  03/01/2019    Active  [id number]

## 2019-03-01 NOTE — ASU DISCHARGE PLAN (ADULT/PEDIATRIC). - MEDICATION SUMMARY - MEDICATIONS TO TAKE
I will START or STAY ON the medications listed below when I get home from the hospital:    ibuprofen 800 mg oral tablet  -- 1 tab(s) by mouth 2 times a day  -- Indication: For Home med    losartan 100 mg oral tablet  -- 1 tab(s) by mouth once a day  -- Indication: For Home med    ProAir HFA 90 mcg/inh inhalation aerosol  -- 2 puff(s) inhaled 4 times a day, As Needed, last used 1 year ago   -- Indication: For Home med    amLODIPine 5 mg oral tablet  -- 1 tab(s) by mouth once a day  -- Indication: For Home med    cyclobenzaprine 10 mg oral tablet  -- 1 tab(s) by mouth once a day, As Needed  -- Indication: For Home med

## 2019-03-02 RX ORDER — OXYCODONE AND ACETAMINOPHEN 5; 325 MG/1; MG/1
5-325 TABLET ORAL
Qty: 5 | Refills: 0 | Status: ACTIVE | COMMUNITY
Start: 2019-03-02 | End: 1900-01-01

## 2019-03-05 LAB — SURGICAL PATHOLOGY STUDY: SIGNIFICANT CHANGE UP

## 2019-03-11 ENCOUNTER — APPOINTMENT (OUTPATIENT)
Dept: ORTHOPEDIC SURGERY | Facility: CLINIC | Age: 59
End: 2019-03-11
Payer: MEDICARE

## 2019-03-11 DIAGNOSIS — M72.0 PALMAR FASCIAL FIBROMATOSIS [DUPUYTREN]: ICD-10-CM

## 2019-03-11 DIAGNOSIS — M65.342 TRIGGER FINGER, LEFT RING FINGER: ICD-10-CM

## 2019-03-11 PROCEDURE — 99024 POSTOP FOLLOW-UP VISIT: CPT

## 2019-06-10 ENCOUNTER — EMERGENCY (EMERGENCY)
Facility: HOSPITAL | Age: 59
LOS: 1 days | Discharge: HOME | End: 2019-06-10
Attending: EMERGENCY MEDICINE | Admitting: EMERGENCY MEDICINE
Payer: MEDICARE

## 2019-06-10 VITALS
OXYGEN SATURATION: 98 % | DIASTOLIC BLOOD PRESSURE: 82 MMHG | SYSTOLIC BLOOD PRESSURE: 147 MMHG | RESPIRATION RATE: 18 BRPM | HEART RATE: 75 BPM | TEMPERATURE: 98 F

## 2019-06-10 VITALS
DIASTOLIC BLOOD PRESSURE: 86 MMHG | SYSTOLIC BLOOD PRESSURE: 140 MMHG | RESPIRATION RATE: 18 BRPM | HEART RATE: 64 BPM | OXYGEN SATURATION: 99 %

## 2019-06-10 DIAGNOSIS — S60.511A ABRASION OF RIGHT HAND, INITIAL ENCOUNTER: Chronic | ICD-10-CM

## 2019-06-10 DIAGNOSIS — Z98.890 OTHER SPECIFIED POSTPROCEDURAL STATES: Chronic | ICD-10-CM

## 2019-06-10 PROBLEM — M67.912 UNSPECIFIED DISORDER OF SYNOVIUM AND TENDON, LEFT SHOULDER: Chronic | Status: ACTIVE | Noted: 2019-02-22

## 2019-06-10 PROBLEM — M65.352 TRIGGER FINGER, LEFT LITTLE FINGER: Chronic | Status: ACTIVE | Noted: 2019-02-22

## 2019-06-10 PROBLEM — M54.5 LOW BACK PAIN: Chronic | Status: ACTIVE | Noted: 2019-02-22

## 2019-06-10 PROBLEM — M21.619 BUNION OF UNSPECIFIED FOOT: Chronic | Status: ACTIVE | Noted: 2019-02-22

## 2019-06-10 PROBLEM — R22.32 LOCALIZED SWELLING, MASS AND LUMP, LEFT UPPER LIMB: Chronic | Status: ACTIVE | Noted: 2019-02-22

## 2019-06-10 PROBLEM — M25.551 PAIN IN RIGHT HIP: Chronic | Status: ACTIVE | Noted: 2019-02-22

## 2019-06-10 PROBLEM — M72.0 PALMAR FASCIAL FIBROMATOSIS [DUPUYTREN]: Chronic | Status: ACTIVE | Noted: 2019-02-22

## 2019-06-10 LAB
ALBUMIN SERPL ELPH-MCNC: 4.3 G/DL — SIGNIFICANT CHANGE UP (ref 3.3–5)
ALP SERPL-CCNC: 46 U/L — SIGNIFICANT CHANGE UP (ref 40–120)
ALT FLD-CCNC: 16 U/L — SIGNIFICANT CHANGE UP (ref 4–41)
ANION GAP SERPL CALC-SCNC: 11 MMO/L — SIGNIFICANT CHANGE UP (ref 7–14)
APTT BLD: 26.3 SEC — LOW (ref 27.5–36.3)
AST SERPL-CCNC: 13 U/L — SIGNIFICANT CHANGE UP (ref 4–40)
BASE EXCESS BLDV CALC-SCNC: 4.3 MMOL/L — SIGNIFICANT CHANGE UP
BASOPHILS # BLD AUTO: 0.03 K/UL — SIGNIFICANT CHANGE UP (ref 0–0.2)
BASOPHILS NFR BLD AUTO: 0.3 % — SIGNIFICANT CHANGE UP (ref 0–2)
BILIRUB SERPL-MCNC: < 0.2 MG/DL — LOW (ref 0.2–1.2)
BLOOD GAS VENOUS - CREATININE: 0.76 MG/DL — SIGNIFICANT CHANGE UP (ref 0.5–1.3)
BUN SERPL-MCNC: 20 MG/DL — SIGNIFICANT CHANGE UP (ref 7–23)
CALCIUM SERPL-MCNC: 9.7 MG/DL — SIGNIFICANT CHANGE UP (ref 8.4–10.5)
CHLORIDE BLDV-SCNC: 106 MMOL/L — SIGNIFICANT CHANGE UP (ref 96–108)
CHLORIDE SERPL-SCNC: 101 MMOL/L — SIGNIFICANT CHANGE UP (ref 98–107)
CO2 SERPL-SCNC: 29 MMOL/L — SIGNIFICANT CHANGE UP (ref 22–31)
CREAT SERPL-MCNC: 0.79 MG/DL — SIGNIFICANT CHANGE UP (ref 0.5–1.3)
D DIMER BLD IA.RAPID-MCNC: < 150 NG/ML — SIGNIFICANT CHANGE UP
EOSINOPHIL # BLD AUTO: 0.17 K/UL — SIGNIFICANT CHANGE UP (ref 0–0.5)
EOSINOPHIL NFR BLD AUTO: 1.8 % — SIGNIFICANT CHANGE UP (ref 0–6)
GAS PNL BLDV: 136 MMOL/L — SIGNIFICANT CHANGE UP (ref 136–146)
GLUCOSE BLDV-MCNC: 103 MG/DL — HIGH (ref 70–99)
GLUCOSE SERPL-MCNC: 101 MG/DL — HIGH (ref 70–99)
HCO3 BLDV-SCNC: 26 MMOL/L — SIGNIFICANT CHANGE UP (ref 20–27)
HCT VFR BLD CALC: 45.8 % — SIGNIFICANT CHANGE UP (ref 39–50)
HCT VFR BLDV CALC: 45.8 % — SIGNIFICANT CHANGE UP (ref 39–51)
HGB BLD-MCNC: 14.8 G/DL — SIGNIFICANT CHANGE UP (ref 13–17)
HGB BLDV-MCNC: 15 G/DL — SIGNIFICANT CHANGE UP (ref 13–17)
IMM GRANULOCYTES NFR BLD AUTO: 0.3 % — SIGNIFICANT CHANGE UP (ref 0–1.5)
INR BLD: 0.87 — LOW (ref 0.88–1.17)
LACTATE BLDV-MCNC: 2 MMOL/L — SIGNIFICANT CHANGE UP (ref 0.5–2)
LYMPHOCYTES # BLD AUTO: 2.37 K/UL — SIGNIFICANT CHANGE UP (ref 1–3.3)
LYMPHOCYTES # BLD AUTO: 24.5 % — SIGNIFICANT CHANGE UP (ref 13–44)
MCHC RBC-ENTMCNC: 29.9 PG — SIGNIFICANT CHANGE UP (ref 27–34)
MCHC RBC-ENTMCNC: 32.3 % — SIGNIFICANT CHANGE UP (ref 32–36)
MCV RBC AUTO: 92.5 FL — SIGNIFICANT CHANGE UP (ref 80–100)
MONOCYTES # BLD AUTO: 0.77 K/UL — SIGNIFICANT CHANGE UP (ref 0–0.9)
MONOCYTES NFR BLD AUTO: 8 % — SIGNIFICANT CHANGE UP (ref 2–14)
NEUTROPHILS # BLD AUTO: 6.3 K/UL — SIGNIFICANT CHANGE UP (ref 1.8–7.4)
NEUTROPHILS NFR BLD AUTO: 65.1 % — SIGNIFICANT CHANGE UP (ref 43–77)
NRBC # FLD: 0 K/UL — SIGNIFICANT CHANGE UP (ref 0–0)
PCO2 BLDV: 59 MMHG — HIGH (ref 41–51)
PH BLDV: 7.32 PH — SIGNIFICANT CHANGE UP (ref 7.32–7.43)
PLATELET # BLD AUTO: 250 K/UL — SIGNIFICANT CHANGE UP (ref 150–400)
PMV BLD: 11.5 FL — SIGNIFICANT CHANGE UP (ref 7–13)
PO2 BLDV: 31 MMHG — LOW (ref 35–40)
POTASSIUM BLDV-SCNC: 4 MMOL/L — SIGNIFICANT CHANGE UP (ref 3.4–4.5)
POTASSIUM SERPL-MCNC: 4.5 MMOL/L — SIGNIFICANT CHANGE UP (ref 3.5–5.3)
POTASSIUM SERPL-SCNC: 4.5 MMOL/L — SIGNIFICANT CHANGE UP (ref 3.5–5.3)
PROT SERPL-MCNC: 6.4 G/DL — SIGNIFICANT CHANGE UP (ref 6–8.3)
PROTHROM AB SERPL-ACNC: 9.6 SEC — LOW (ref 9.8–13.1)
RBC # BLD: 4.95 M/UL — SIGNIFICANT CHANGE UP (ref 4.2–5.8)
RBC # FLD: 12.9 % — SIGNIFICANT CHANGE UP (ref 10.3–14.5)
SAO2 % BLDV: 58.1 % — LOW (ref 60–85)
SODIUM SERPL-SCNC: 141 MMOL/L — SIGNIFICANT CHANGE UP (ref 135–145)
TROPONIN T, HIGH SENSITIVITY: 9 NG/L — SIGNIFICANT CHANGE UP (ref ?–14)
WBC # BLD: 9.67 K/UL — SIGNIFICANT CHANGE UP (ref 3.8–10.5)
WBC # FLD AUTO: 9.67 K/UL — SIGNIFICANT CHANGE UP (ref 3.8–10.5)

## 2019-06-10 PROCEDURE — 93010 ELECTROCARDIOGRAM REPORT: CPT

## 2019-06-10 PROCEDURE — 99284 EMERGENCY DEPT VISIT MOD MDM: CPT | Mod: 25,GC

## 2019-06-10 PROCEDURE — 71046 X-RAY EXAM CHEST 2 VIEWS: CPT | Mod: 26

## 2019-06-10 RX ORDER — IPRATROPIUM/ALBUTEROL SULFATE 18-103MCG
3 AEROSOL WITH ADAPTER (GRAM) INHALATION ONCE
Refills: 0 | Status: COMPLETED | OUTPATIENT
Start: 2019-06-10 | End: 2019-06-10

## 2019-06-10 RX ORDER — LIDOCAINE 4 G/100G
1 CREAM TOPICAL ONCE
Refills: 0 | Status: COMPLETED | OUTPATIENT
Start: 2019-06-10 | End: 2019-06-10

## 2019-06-10 RX ORDER — ALBUTEROL 90 UG/1
1 AEROSOL, METERED ORAL ONCE
Refills: 0 | Status: DISCONTINUED | OUTPATIENT
Start: 2019-06-10 | End: 2019-06-14

## 2019-06-10 RX ORDER — DIPHENHYDRAMINE HCL 50 MG
50 CAPSULE ORAL ONCE
Refills: 0 | Status: COMPLETED | OUTPATIENT
Start: 2019-06-10 | End: 2019-06-10

## 2019-06-10 RX ADMIN — Medication 3 MILLILITER(S): at 18:54

## 2019-06-10 RX ADMIN — Medication 3 MILLILITER(S): at 17:01

## 2019-06-10 RX ADMIN — Medication 50 MILLIGRAM(S): at 18:54

## 2019-06-10 RX ADMIN — Medication 125 MILLIGRAM(S): at 17:11

## 2019-06-10 RX ADMIN — LIDOCAINE 1 PATCH: 4 CREAM TOPICAL at 18:54

## 2019-06-10 RX ADMIN — Medication 3 MILLILITER(S): at 16:50

## 2019-06-10 NOTE — ED ADULT TRIAGE NOTE - CHIEF COMPLAINT QUOTE
Pt c/o SOB x 4-5 days, with cough and chills. Appears SOB, +use of accessory muscles. Also having CP. Hx: HTN.

## 2019-06-10 NOTE — ED ADULT NURSE NOTE - OBJECTIVE STATEMENT
pt alert,orinnted x3. reports cough x past wk with increased diff breathing x past 3 days. reports diff sleeping at night. reports lower back pain following fall frm golf cart 1 wk ago. reports ch discomfort earlier,denies at present. decreased bs noted

## 2019-06-10 NOTE — ED PROVIDER NOTE - CLINICAL SUMMARY MEDICAL DECISION MAKING FREE TEXT BOX
59 yo M h/o asthma, HTN, p/w 1 wk of worsening SOB, mild non productive cough, starting w/ mild throat discomfort w/ hoarse voice and subjective fevers and chills. +brief (seconds) episode of CP x 1 today now fully resolved. No PE risk factors. DDx COPD/reactive airways, PNA, ACS- although less likely. Will eval w/ EKG, CBC, CMP, VBG w/ lactate, troponin, CXR; initial treatment w/ duoneb x 2, and steroids.

## 2019-06-10 NOTE — ED ADULT NURSE REASSESSMENT NOTE - NS ED NURSE REASSESS COMMENT FT1
wheezing and rhonchi still present s/p 1st neb. 2nd neb given will re-assess. pt speaking full sentences without difficulty

## 2019-06-10 NOTE — ED PROVIDER NOTE - ENMT, MLM
Airway patent, Nasal mucosa clear. Mouth with normal mucosa. Throat has no vesicles, no oropharyngeal exudates; +swollen uvula

## 2019-06-10 NOTE — ED PROVIDER NOTE - NSFOLLOWUPINSTRUCTIONS_ED_ALL_ED_FT
You were seen in the Emergency Department for shortness of breath.    1) Advance activity as tolerated.    2) Continue all previously prescribed medications as directed.   your prescription of prednisone and take as directed.  3) Follow up with your primary care physician in 24-48 hours - take copies of your results.  Follow up with a pulmonologist this week.  4) Return to the Emergency Department for worsening or persistent symptoms, and/or ANY NEW OR CONCERNING SYMPTOMS. If you have issues obtaining follow up, please call: [phone] to obtain a doctor or specialist who takes your insurance in your area.

## 2019-06-10 NOTE — ED PROVIDER NOTE - OBJECTIVE STATEMENT
59 yo M h/o HTN reports 1 wk of SOB. SOB started 1 wk ago after he 59 yo M h/o HTN, asthma (per chart), reports 1 wk of SOB. SOB started 1 wk ago after partying/golfing. States it began w/ mild throat discomfort and later that day developed shortness of breath which is worse w/ exertion. Occasional cough, non productive, no hemoptysis. Wife at bedside also notes "noisy breathing" at times. Pt. reports SOB worse at night, waking him up but no extra pillows and SOB not positional, including lying flat. Notes subjective fevers and chills over the last 3 days. Also c/o CP, located centrally, non - radiating, lasting seconds x 1 time episode just prior to arrival when getting up from a seated position. Not associated with diaphoresis, N/V, radiation. Pt. denies history of PE/DVT, history of coagulopathy, leg pain/swelling/redness/warmth, no pleuritic pain, travel/prolonged immobolization

## 2019-06-10 NOTE — ED PROVIDER NOTE - ATTENDING CONTRIBUTION TO CARE
DR. BLOCH, ATTENDING MD-  I performed a face to face bedside interview with patient regarding history of present illness, review of symptoms and past medical history. I completed an independent physical exam.  I have discussed patient's plan of care with the fellow.  Patient plethoric, large edematous uvula, voice hoarse, not tachypneic at rest, heart sounds nml lungs clear but decreased BS, abd soft nontender,extrem no edema ,sensation intact, no calf tenderness.motor nml DR. BLOCH, ATTENDING MD-  I performed a face to face bedside interview with patient regarding history of present illness, review of symptoms and past medical history. I completed an independent physical exam.  I have discussed patient's plan of care with the fellow.  Patient plethoric, large edematous uvula, voice hoarse, not tachypneic at rest, heart sounds nml lungs clear but decreased BS, abd soft nontender,extrem no edema ,sensation intact, no calf tenderness.motor nml. exacerbation of COPD with edematous uvula.

## 2019-06-10 NOTE — ED PROVIDER NOTE - NSFOLLOWUPCLINICS_GEN_ALL_ED_FT
[facility]  Pulmonology  [address] [place] [address]  Phone: [phone]  Fax:   Follow Up Time:

## 2019-06-10 NOTE — ED ADULT NURSE NOTE - NSIMPLEMENTINTERV_GEN_ALL_ED
Implemented All Fall Risk Interventions:  [place] to call system. Call bell, personal items and telephone within reach. Instruct patient to call for assistance. Room bathroom lighting operational. Non-slip footwear when patient is off stretcher. Physically safe environment: no spills, clutter or unnecessary equipment. Stretcher in lowest position, wheels locked, appropriate side rails in place. Provide visual cue, wrist band, yellow gown, etc. Monitor gait and stability. Monitor for mental status changes and reorient to person, place, and time. Review medications for side effects contributing to fall risk. Reinforce activity limits and safety measures with patient and family.

## 2019-06-10 NOTE — ED PROVIDER NOTE - PROGRESS NOTE DETAILS
Annel Plasencia M.D. Resident: Pt received at signout, reevaluated, SpO2 94% on RA, reports his SOB has improved, offered CDU admission for further nebulizer treatment, would like to go home, advised patient to quit smoking and follow up with a pulmonologist. Will prescribe albuterol inhaler and steroids. Annel Plasencia M.D. Resident: Pt received at signout, reevaluated, SpO2 94% on RA, end exp wheeze to bilateral lung fields, reports his SOB has improved, offered CDU admission for further nebulizer treatment, would like to go home, advised patient to quit smoking and follow up with a pulmonologist. Will prescribe albuterol inhaler and steroids.

## 2019-07-29 ENCOUNTER — APPOINTMENT (OUTPATIENT)
Dept: PULMONOLOGY | Facility: CLINIC | Age: 59
End: 2019-07-29
Payer: MEDICARE

## 2019-07-29 VITALS
BODY MASS INDEX: 37.8 KG/M2 | HEART RATE: 82 BPM | TEMPERATURE: 98 F | HEIGHT: 70 IN | OXYGEN SATURATION: 97 % | DIASTOLIC BLOOD PRESSURE: 78 MMHG | RESPIRATION RATE: 16 BRPM | WEIGHT: 264 LBS | SYSTOLIC BLOOD PRESSURE: 127 MMHG

## 2019-07-29 DIAGNOSIS — I27.20 PULMONARY HYPERTENSION, UNSPECIFIED: ICD-10-CM

## 2019-07-29 DIAGNOSIS — Z86.79 PERSONAL HISTORY OF OTHER DISEASES OF THE CIRCULATORY SYSTEM: ICD-10-CM

## 2019-07-29 PROCEDURE — 94727 GAS DIL/WSHOT DETER LNG VOL: CPT

## 2019-07-29 PROCEDURE — 94729 DIFFUSING CAPACITY: CPT

## 2019-07-29 PROCEDURE — 94060 EVALUATION OF WHEEZING: CPT

## 2019-07-29 PROCEDURE — 99204 OFFICE O/P NEW MOD 45 MIN: CPT | Mod: 25

## 2019-07-29 NOTE — HISTORY OF PRESENT ILLNESS
[FreeTextEntry1] : 59 y/o man referred for "problem with blood flow" in his lungs. No c/o cough, wheezing or shortness of breath. No h/o lung disease. Smokes 1 PPD. Has smoked for 43 years. Retired .

## 2019-07-29 NOTE — DISCUSSION/SUMMARY
[FreeTextEntry1] : 59 y/o smoker with h/o hypertension. Apparently he is suspected of having pulmonary hypertension on a recent Echocardiogram. No h/o lung disease. He is a retired . \par \par I will obtain his Echocardiogram report. If he does have pulmonary hypertension the phentermine may need to be stopped. \par \par Given that he smoked a pack per day for 37 years he is eligible for an low dose Chest CT. This was discussed with him. A brochure was given to him. He will let me know if he would like to proceed with it. \par \par Smoking cessation was also discussed. The various options were reviewed with him. A smoking cessation program was discussed. Between 5 and 10 minutes was spent in this discussion. \par \par Further recommendations to follow after the Echocardiogram report is available.

## 2019-07-29 NOTE — PROCEDURE
[FreeTextEntry1] : PFT 7/29/19: Spirometry was normal. Lung volumes were normal. Diffusion was mildly reduced. No response to bronchodilator. \par \par Chest x-ray 12/11/17: Lungs were clear. Heart size was normal. No pleural effusions. \par \par

## 2019-07-29 NOTE — REVIEW OF SYSTEMS
[Hypertension] : ~T hypertension [Back Pain] : ~T back pain [Arthralgias] : arthralgias [Fever] : no fever [Nasal Congestion] : no nasal congestion [Cough] : no cough [Hemoptysis] : no hemoptysis [Wheezing] : no wheezing [Palpitations] : no palpitations [Nasal Discharge] : no nasal discharge [Heartburn] : no heartburn [Rash] : no [unfilled] rash [DVT] : no DVT [Difficulty Initiating Sleep] : no difficulty falling asleep

## 2019-08-23 NOTE — H&P PST ADULT - PAIN CHRONIC, PROFILE
Doing well.  No complaints.  Reports fetal movement.  Denies contractions, leakage of fluid, vaginal bleeding.  All questions answered.  RTC in 4 weeks or sooner if needed.    
yes

## 2019-09-12 ENCOUNTER — APPOINTMENT (OUTPATIENT)
Dept: ORTHOPEDIC SURGERY | Facility: CLINIC | Age: 59
End: 2019-09-12

## 2019-09-20 NOTE — ASU PATIENT PROFILE, ADULT - MENTAL HEALTH CONDITIONS/SYMPTOMS, PROFILE
Sarath Mcdonald  : 2004  Primary: [address]  Secondary:  [address]  at [address], [address] [place] [address]  [phone]   [phone]      OUTPATIENT PHYSICAL THERAPY: Daily Treatment Note 2019    ICD-10: Treatment Diagnosis: pain in left knee (M25.562)                Treatment Diagnosis 2: patellar tendinitis, left knee (M76.52)                Treatment Diagnosis 3: gait dysfunction (R26.89)  Precautions: None  Allergies: Patient has no known allergies. TREATMENT PLAN:  Effective Dates: 2019 TO 2019  Frequency/Duration: 2 times a week for 8 weeks MEDICAL/REFERRING DIAGNOSIS:  Pain in left knee [M25.562]  Patellar tendinitis, left knee [M76.52]   DATE OF ONSET: chronic knee pain and worsened recently in the last few months  REFERRING PHYSICIAN: Zac Lou MD MD Orders: Evaluate and Treat   Return MD Appointment: 2019     Pre-treatment Symptoms/Complaints:  Patient reported improvements overall and no new complaints. Pain: Initial: Pain Intensity 1: 4  Pain Location 1: Knee  Pain Orientation 1: Left  Post Session:  4/10   Medications Last Reviewed:  2019  Updated Objective Findings:  decreased tenderness to palpation of joint line   TREATMENT:   THERAPEUTIC EXERCISE: (15 minutes):  Exercises per grid below to improve mobility, strength, balance and coordination. Required minimal visual, verbal and manual cues to promote proper body alignment, promote proper body posture, promote proper body mechanics and promote proper body breathing techniques. Progressed resistance, range, repetitions and complexity of movement as indicated.      Date:  2019 Date:  2019 Date:     Activity/Exercise Parameters Parameters Parameters   Calf stretch Strap 3 x 30 sec  Angle board 3 x 30 sec Angle board 3 x 30 sec   Hamstring stretch Strap 3 x 30 sec Strap 3 x 30 sec Strap 3 x 30 sec   Piriformis stretch 3 x 30 sec 3 x 30 sec 3 x 30 sec   Straight leg raise flexion 2 x 10 3 x 10 3 x 10   Sidelying hip abduction 2 x 10 3 x 10 3 x 10   Prone hip extension 2 x 10 3 x 10 3 x 10   clamshell 2 x 10 3 x 10 3 x 10   Unilateral bridge 2 x 10 3 x 10 3 x 10             MANUAL THERAPY: (15 minutes): Soft tissue mobilization was utilized and necessary because of the patient's restricted motion of soft tissue   None today but will perform soft tissue mobilization next session    MODALITIES: (30 minutes total; 15 vasopneumatic and 15 heat and electrical stimulation): *  Electrical Stimulation Therapy (with heat in supine with legs on prop) was provided with intensity adjusted throughout treatment to patient tolerance. 4 pads interferential anterolateral left knee       vasopneumatic in supine with legs on prop for swelling and pain     HEP: As above; handouts given to patient for all exercises. Treatment/Session Summary:    · Response to Treatment:  Patient still presenting with lateral joint line swelling. Will continue to progress as tolerated and will do more exercise next week if right great toe allows Knee visibly less effused at the end of session after vasopneumatic  · Communication/Consultation:  None today  · Equipment provided today:  None today  · Recommendations/Intent for next treatment session: Next visit will focus on flexibility, strengthening, functional re-training, and symptom control. .    Total Treatment Billable Duration:  60 minutes  PT Patient Time In/Time Out  Time In: 4340  Time Out: [address] [place], [place] none

## 2020-07-16 NOTE — H&P PST ADULT - HIV STATUS
[facility]        Outpatient Follow-Up note         7/16/2020 11:31 AM    Patient Name:   Emily Gleason  YOB: 1988  Primary Care Physician:  Erich Rodriguez     Chief Complaint / Reason for follow-up : Follow Up of Kidney Transplant     Interval History :  Patient seen and examined by me. Feels well. No cp or SOB. Occasional diarrea. cellcept has been decreased    Living Kidney Transplant - 12/2017 from aunt at [facility]  No Rejections . He is off gengraf   On evorolimus and cell cept     Past History :  Past Medical History:   Diagnosis Date    Anemia in chronic kidney disease(285.21)     Bowel obstruction ([address] 1988    Cerebral palsy (HCC)     Chronic kidney disease, stage IV (severe) ([address]     if develops ESRD would do peritoneal dialysis and possible kidney transplant.     Developmental disorder     Hard of hearing     Hypertension     Hypothyroidism     Mentally disabled     Pre-transplant evaluation for CKD (chronic kidney disease) 04/20/2017    [facility] to [facility] Pre Kidney [facility]    Proteinuria      Past Surgical History:   Procedure Laterality Date    ABDOMEN SURGERY      bowel obstruction and repair-1988, g tube placed and removed age 3   [facility] ADENOIDECTOMY      EYE SURGERY  age 1    bilateral eyes    TYMPANOSTOMY TUBE PLACEMENT      multiple up to age 6        Medications :     Outpatient Medications Marked as Taking for the 7/16/20 encounter (Office Visit) with Rylee Bridges MD   Medication Sig Dispense Refill    doxycycline hyclate (VIBRA-TABS) 100 MG tablet Take 100 mg by mouth 2 times daily X 10 days      mupirocin (BACTROBAN) 2 % ointment 2 times daily      allopurinol (ZYLOPRIM) 300 MG tablet Take 1 tablet by mouth daily 90 tablet 3    aspirin 81 MG chewable tablet Take 81 mg by mouth daily      Sodium Chloride-Sodium Bicarb (AYR SALINE NASAL RINSE NA) by Nasal route daily      CPAP Machine MISC by Does not apply Physical Exam     General -- no distress  Lungs -- clear  Heart -- S1, S2 heard, JVD - no  Abdomen - soft, non-tender  Extremities -- mild edema  CNS - awake and alert    Labs, Radiology and Tests    Labs -    9/18 3/19 11/19 6/20        Potassium 4.4 4.3 4.2 4.3        BUN 19 15 17 13        Creatinine 1.8 1.4 1.3 1.2        eGFR 48  68 75                    UPCR            CR                          Renal Ultrasound Scan        Assessment    1. Renal - chronic kidney disease stage III with a recent history of kidney transplant 12/2017  - GFR is much better at this time after stopping gengraf  - He is currently on mycophenolate  540 BID and also zotress 1 mg BID  2. essential hypertension-running well continue current medications. 3. Chronic sinusitis - resolved . 4. Gout on allopurinol  5. Meds reviewed and D/W patient  6. FU in 12 month. Labs being done every 2 months    Tests and orders placed this Encounter     Orders Placed This Encounter   Procedures    Basic Metabolic Panel    Microalbumin / Creatinine Urine Ratio    Protein / creatinine ratio, urine    Urinalysis with Microscopic       Kimberli Ames M.D  [facility]. Negative/Unknown

## 2021-01-28 ENCOUNTER — APPOINTMENT (OUTPATIENT)
Dept: ORTHOPEDIC SURGERY | Facility: CLINIC | Age: 61
End: 2021-01-28
Payer: MEDICARE

## 2021-01-28 VITALS — TEMPERATURE: 97.2 F

## 2021-01-28 DIAGNOSIS — M19.011 PRIMARY OSTEOARTHRITIS, RIGHT SHOULDER: ICD-10-CM

## 2021-01-28 PROCEDURE — 20610 DRAIN/INJ JOINT/BURSA W/O US: CPT | Mod: LT

## 2021-01-28 PROCEDURE — 99214 OFFICE O/P EST MOD 30 MIN: CPT | Mod: 25

## 2021-01-28 PROCEDURE — 73030 X-RAY EXAM OF SHOULDER: CPT | Mod: 50

## 2021-01-29 PROBLEM — M19.011 PRIMARY OSTEOARTHRITIS OF RIGHT SHOULDER: Status: ACTIVE | Noted: 2021-01-29

## 2021-01-29 NOTE — DISCUSSION/SUMMARY
[de-identified] : 59 y/o male with bilateral shoulder OA. \par \par Patient presents with bilateral shoulder osteoarthritis, with more significant pain on examination of the left shoulder.  Patient is status post arthroscopy of the left shoulder, with advancing arthrosis on x-ray imaging.  I discussed the treatment of degenerative shoulder arthritis with the patient at length today. I described the spectrum of treatment from nonoperative modalities to total joint arthroplasty. Noninvasive and nonoperative treatment modalities include activity modification with avoidance of overhead activity/excessive glenohumeral rotation, PRN use of acetaminophen or anti-inflammatory medication if tolerated, and physical therapy. Additional treatment can include intermittent corticosteroid injection for acute pain relief. Possible use of visco-supplementation also.\par \par Definitive treatment of total joint arthroplasty would be considered for failure of conservative management, progressive pain, and ultimate inability to perform ADL's. Discussed consideration between TSA and revTSA depending on the quality and condition of the rotator cuff tendon. Short-term and long-term outcomes were discussed as well as the potential need for revision arthroplasty. \par \par Injection therapy was given to left shoulder for therapeutic and symptomatic relief.  Continue symptomatic/supportive care.\par \par The risks and benefits of each treatment options was discussed and all questions were answered. \par \par Follow up as needed if symptoms persist.

## 2021-01-29 NOTE — ADDENDUM
[FreeTextEntry1] : This note was written by Roxie Palmer on 01/28/2021 acting solely as a scribe for Dr. Kory Aguayo.\par \par All medical record entries made by the Scribe were at my, Dr. Kory Aguayo, direction and personally dictated by me on 01/28/2021. I have personally reviewed the chart and agree that the record accurately reflects my personal performance of the history, physical exam, assessment and plan.

## 2021-01-29 NOTE — PHYSICAL EXAM
[de-identified] : Oriented to time, place, person\par Mood: Normal\par Affect: Normal\par Appearance: Healthy, well appearing, no acute distress.\par Gait: Normal\par Assistive Devices: None\par \par Bilateral shoulder exam:\par \par Inspection: No malalignment, No defects, No atrophy\par Skin: No masses, No lesions\par Neck: Negative Spurling, full ROM, no pain with ROM\par AROM: FF to 170, abduction to 90, ER to neutral RIGHT 20 LEFT, IR to upper lumbar\par Painful arc ROM: Pain with further motion\par Tenderness: No bicipital tenderness, no tenderness to greater tuberosity/RTC insertion, +anterior shoulder/lesser tuberosity tenderness\par Strength: 4/5 Right 4+/5 Left ER, 5/5 IR in adduction, 4/5 Right 4+/5 Left supraspinatus testing, negative [place] test\par AC joint: No TTP/pain with cross arm testing\par Biceps: Speed Negative, Yergason Negative \par Impingement test: +Moon, + Neer\par Vasc: 2+ radial pulse \par Stability: Stable \par Neuro: AIN, PIN, Ulnar nerve intact to motor\par Sensation: Intact to light touch throughout  [de-identified] : The following radiographs were ordered and read by me during this patients visit. I reviewed each radiograph in detail with the patient and discussed the findings as highlighted below.\par \par 3 views of bilateral shoulder were obtained today, 01/28/2021, that show no acute fracture or dislocation. There is moderate glenohumeral and mild  AC joint degenerative change seen bilaterally. Type II acromions. There is no significant malalignment. No significant other obvious osseous abnormality, otherwise unremarkable.

## 2021-01-29 NOTE — HISTORY OF PRESENT ILLNESS
[de-identified] : 60 year-old male s/p left shoulder arthroscopic biceps tenotomy, RTCR (SS, SSc), SAD, ACJR 2017, presents today with persistent worsening bilateral shoulder pain L>R. Pain is constant worse with overhead movements.  Left shoulder pain began when he was attempting to replace a light bulb. Denies numbness or tingling. Also reports spasm of the left bicep muscle.  Patient has known arthrosis within the shoulder joints, and likely has progression of disease.  Presents today for further evaluation and treatment as needed.

## 2021-01-29 NOTE — PROCEDURE
[de-identified] : Injection: Left glenohumeral joint.\par Indication: Osteoarthritis.\par \par A discussion was had with the patient regarding this procedure and all questions were answered. All risks, benefits and alternatives were discussed. These included but were not limited to bleeding, infection, and allergic reaction. Alcohol was used to clean the skin, and betadine was used to sterilize and prep the area in the posterior aspect of the left shoulder. Ethyl chloride spray was then used as a topical anesthetic. A 21-gauge needle was used to inject 4cc of 1% lidocaine and 1cc of 40mg/ml methylprednisolone into the left glenohumeral joint. A sterile bandage was then applied. The patient tolerated the procedure well and there were no complications.

## 2021-02-02 ENCOUNTER — APPOINTMENT (OUTPATIENT)
Dept: ORTHOPEDIC SURGERY | Facility: CLINIC | Age: 61
End: 2021-02-02
Payer: MEDICARE

## 2021-02-02 VITALS — DIASTOLIC BLOOD PRESSURE: 90 MMHG | HEART RATE: 46 BPM | HEIGHT: 70 IN | SYSTOLIC BLOOD PRESSURE: 144 MMHG

## 2021-02-02 VITALS — TEMPERATURE: 96.5 F

## 2021-02-02 DIAGNOSIS — M17.12 UNILATERAL PRIMARY OSTEOARTHRITIS, LEFT KNEE: ICD-10-CM

## 2021-02-02 PROCEDURE — 20610 DRAIN/INJ JOINT/BURSA W/O US: CPT | Mod: LT

## 2021-02-02 PROCEDURE — 73564 X-RAY EXAM KNEE 4 OR MORE: CPT | Mod: LT

## 2021-02-02 PROCEDURE — 99214 OFFICE O/P EST MOD 30 MIN: CPT | Mod: 25

## 2021-02-02 PROCEDURE — 99072 ADDL SUPL MATRL&STAF TM PHE: CPT

## 2021-02-02 NOTE — HISTORY OF PRESENT ILLNESS
[de-identified] : This is a very nice  60 year old  male experiencing worsening pain in the left knee which is severe in intensity and has been going on for at least 3 months now. He had a cortisone injection 6 mos ago which helped somewhat. He is an active smoker. his wife was recently diagnosed with lung Ca and he is her primary care giver. The pain substantially limits activities of daily living. Walking tolerance is reduced. Medication and activity modification have been minimally effective for a period lasting greater than three months in duration. Assistive devices and external support were not deemed by the patient to be helpful in improving their function. Due to the severity of osteoarthritis and level of pain, physical therapy is contraindicated. Pain and restriction of function are intolerable at this time. The patient denies any radiation of the pain to the feet and it is not associated with numbness, tingling, or weakness.

## 2021-02-02 NOTE — DISCUSSION/SUMMARY
[de-identified] : This patient has left knee osteoarthritis.  The patient is not an appropriate candidate for surgical intervention at this time as he has too much going on in his life with his wife's lung cancer and also he would have to stop smoking ahead of time before surgery to be a candidate. An extensive discussion was conducted on the natural history of the disease and the variety of surgical and non-surgical options available to the patient including, but not limited to non-steroidal anti-inflammatory medications, steroid injections, physical therapy, maintenance of ideal body weight, and reduction of activity.  Today performed a left knee intra-articular cortisone injections.  NSAIDs were recommended.  Home exercise program is recommended.  Cigarette smoking cessation was recommended.  Follow-up in 3 months.\par \par Informed consent for the left knee injection was obtained. All questions were answered. A time out was performed. The left knee was prepped and draped in sterile fashion. Using sterile technique, the left knee was injected with 2cc of Kenalog, 4cc of 1% lidocaine, 4cc of 0.25% marcaine using a 21-gauge needle. A sterile dressing was applied. Post injection instructions were reviewed. The patient tolerated the procedure well.\par \par This patient is a smoker or has chronic use of nicotine products and understands our nicotine cessation policy and will be required to stop smoke 1 month before surgery and this will be continued through 1 month minimum after surgery. This includes all cigarettes, cigars, chewing tobacco, patches and gums which contain nicotine. I explained to the patient the risk of nicotine exposure, which is well documented in the orthopedic literature as increasing risks of wound breakdown (dehiscence), periprosthetic joint infection, dissatisfaction, chronic pain, and impaired overall outcome to the patient. The patient may be tested for nicotine in addition prior to undergoing joint arthroplasty. We discussed the options of quitting immediately, as well as the risk of tapering off with or without the use of medications or counseling. The patient is in agreement with this plan.\par

## 2021-02-02 NOTE — PHYSICAL EXAM
[de-identified] : Well developed, well nourished in no apparent distress, awake, alert and orientated to person, place and time. with appropriate mood and affect. \par Respirations are even and unlabored. Gait evaluation does reveal a limp. There is no inguinal adenopathy. \par The affected limb is well-perfused, without skin lesions, shows a grossly normal motor and sensory examination. \par Knee motion is significantly reduced and does cause significant pain. ROM of the knee is 5-115 degrees. \par The knee is stable within that range-of-motion to AP and ML stress. \par The alignment of the knee is 5 degrees valgus. \par Muscle strength is normal. Pedal pulses are palpable.  [de-identified] : Long standing  knee, AP knee, lateral knee, and patellar views of the left knee were ordered and taken in the office and demonstrate severe degenerative joint disease (lateral compartment) of the knee with joint space narrowing, osteophyte formation, and subchondral sclerosis.

## 2021-11-28 ENCOUNTER — EMERGENCY (EMERGENCY)
Facility: HOSPITAL | Age: 61
LOS: 0 days | Discharge: HOME | End: 2021-11-28
Payer: MEDICARE

## 2021-11-28 VITALS
RESPIRATION RATE: 19 BRPM | DIASTOLIC BLOOD PRESSURE: 69 MMHG | HEART RATE: 73 BPM | HEIGHT: 71 IN | OXYGEN SATURATION: 96 % | WEIGHT: 259.93 LBS | TEMPERATURE: 98 F | SYSTOLIC BLOOD PRESSURE: 125 MMHG

## 2021-11-28 DIAGNOSIS — S33.9XXA SPRAIN OF UNSPECIFIED PARTS OF LUMBAR SPINE AND PELVIS, INITIAL ENCOUNTER: ICD-10-CM

## 2021-11-28 DIAGNOSIS — Z20.822 CONTACT WITH AND (SUSPECTED) EXPOSURE TO COVID-19: ICD-10-CM

## 2021-11-28 DIAGNOSIS — J44.1 CHRONIC OBSTRUCTIVE PULMONARY DISEASE WITH (ACUTE) EXACERBATION: ICD-10-CM

## 2021-11-28 DIAGNOSIS — R51.9 HEADACHE, UNSPECIFIED: ICD-10-CM

## 2021-11-28 DIAGNOSIS — F17.200 NICOTINE DEPENDENCE, UNSPECIFIED, UNCOMPLICATED: ICD-10-CM

## 2021-11-28 DIAGNOSIS — M54.9 DORSALGIA, UNSPECIFIED: ICD-10-CM

## 2021-11-28 DIAGNOSIS — M54.2 CERVICALGIA: ICD-10-CM

## 2021-11-28 DIAGNOSIS — F07.81 POSTCONCUSSIONAL SYNDROME: ICD-10-CM
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
## 2021-11-28 DIAGNOSIS — Z98.890 OTHER SPECIFIED POSTPROCEDURAL STATES: Chronic | ICD-10-CM

## 2021-11-28 DIAGNOSIS — I10 ESSENTIAL (PRIMARY) HYPERTENSION: ICD-10-CM

## 2021-11-28 DIAGNOSIS — S13.4XXA SPRAIN OF LIGAMENTS OF CERVICAL SPINE, INITIAL ENCOUNTER: ICD-10-CM

## 2021-11-28 DIAGNOSIS — S60.511A ABRASION OF RIGHT HAND, INITIAL ENCOUNTER: Chronic | ICD-10-CM
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
## 2021-11-28 LAB
FLUAV AG NPH QL: SIGNIFICANT CHANGE UP
FLUBV AG NPH QL: SIGNIFICANT CHANGE UP
SARS-COV-2 RNA SPEC QL NAA+PROBE: SIGNIFICANT CHANGE UP

## 2021-11-28 PROCEDURE — 72070 X-RAY EXAM THORAC SPINE 2VWS: CPT | Mod: 26

## 2021-11-28 PROCEDURE — 72125 CT NECK SPINE W/O DYE: CPT | Mod: 26,MH

## 2021-11-28 PROCEDURE — 70450 CT HEAD/BRAIN W/O DYE: CPT | Mod: 26,MH

## 2021-11-28 PROCEDURE — 71046 X-RAY EXAM CHEST 2 VIEWS: CPT | Mod: 26

## 2021-11-28 PROCEDURE — 99284 EMERGENCY DEPT VISIT MOD MDM: CPT

## 2021-11-28 PROCEDURE — 72100 X-RAY EXAM L-S SPINE 2/3 VWS: CPT | Mod: 26

## 2021-11-28 RX ORDER — OXYCODONE AND ACETAMINOPHEN 5; 325 MG/1; MG/1
1 TABLET ORAL
Qty: 12 | Refills: 0
Start: 2021-11-28 | End: 2021-12-01

## 2021-11-28 RX ORDER — AZITHROMYCIN 500 MG/1
500 TABLET, FILM COATED ORAL ONCE
Refills: 0 | Status: COMPLETED | OUTPATIENT
Start: 2021-11-28 | End: 2021-11-28

## 2021-11-28 RX ORDER — AZITHROMYCIN 500 MG/1
1 TABLET, FILM COATED ORAL
Qty: 6 | Refills: 0
Start: 2021-11-28 | End: 2021-12-02

## 2021-11-28 RX ORDER — ALBUTEROL 90 UG/1
2 AEROSOL, METERED ORAL
Qty: 1 | Refills: 0
Start: 2021-11-28 | End: 2021-11-30

## 2021-11-28 RX ORDER — IPRATROPIUM/ALBUTEROL SULFATE 18-103MCG
3 AEROSOL WITH ADAPTER (GRAM) INHALATION ONCE
Refills: 0 | Status: COMPLETED | OUTPATIENT
Start: 2021-11-28 | End: 2021-11-28

## 2021-11-28 RX ADMIN — AZITHROMYCIN 500 MILLIGRAM(S): 500 TABLET, FILM COATED ORAL at 13:29

## 2021-11-28 RX ADMIN — Medication 3 MILLILITER(S): at 13:29

## 2021-11-28 NOTE — ED PROVIDER NOTE - MUSCULOSKELETAL, MLM
Spine appears normal, pain with ROM of the cervical, + midline thoracic and lumbar spine, range of motion is not limited, no muscle or joint tenderness

## 2021-11-28 NOTE — ED PROVIDER NOTE - PATIENT PORTAL LINK FT
You can access the FollowMyHealth Patient Portal offered by [facility] by registering at the following website: [email or web address]. By joining [employer]’s FollowMyHealth portal, you will also be able to view your health information using other applications (apps) compatible with our system.

## 2021-11-28 NOTE — ED PROVIDER NOTE - CARE PLAN
1 Principal Discharge DX:	Neck sprain  Secondary Diagnosis:	Low back sprain  Secondary Diagnosis:	Post concussive syndrome  Secondary Diagnosis:	COPD exacerbation

## 2021-11-28 NOTE — ED PROVIDER NOTE - CLINICAL SUMMARY MEDICAL DECISION MAKING FREE TEXT BOX
Recommend CT head and Cspine, xray thoracic and lumbar xray, CXR,  flu/covid swab, albuterol/azithromycin

## 2021-11-28 NOTE — ED ADULT TRIAGE NOTE - CHIEF COMPLAINT QUOTE
Cough, congestion X2-3weeks. S/p fall last Wednesday c/o body pain neck down  Hit head when fell  Went to [facility] for [facility] and sent to ED

## 2021-11-28 NOTE — ED ADULT NURSE NOTE - CHIEF COMPLAINT QUOTE
Cough, congestion X2-3weeks. S/p fall last Wednesday c/o body pain neck down  Hit head when fell  Went to [facility] for Zpack and sent to ED

## 2021-11-28 NOTE — ED PROVIDER NOTE - OBJECTIVE STATEMENT
61F history HTN, mild COPD, here s/p flip and fall x 4 days who. He reports slip and fell backwards, hit his head + brief LOC. Was not evaluated at the time. Here with persistent headache since that time. No vomiting. Denies numbness or weakness. Denies CP or SOB. Also reports neck and back pain. Denies incontinence. Has been ambulatory.    also reports URI symptoms x 2 weeks, cough and congestion. Denies fever or chills.

## 2021-11-28 NOTE — ED ADULT NURSE NOTE - OBJECTIVE STATEMENT
Pt c/o congestion/cough x 2 weeks, slip/ fall on ice last Wednesday , c/o neck, b/l shoulder, and back pain . h/o htn, hld, denies blood thinners.

## 2022-03-13 NOTE — ED PROVIDER NOTE - DISCHARGE DATE
Sugars in 200s.  -discussed dietary modification with patient (she is not keen on stopping sugary drinks)  -check a1c in case of undiagnosed diabetes or prediabetes exacerbated by steroids  -continue blood glucose monitoring and correction scale  -if remains persistently >200, can do daily NPH with am steroids     02-Mar-2017

## 2022-06-02 ENCOUNTER — APPOINTMENT (OUTPATIENT)
Dept: ORTHOPEDIC SURGERY | Facility: CLINIC | Age: 62
End: 2022-06-02

## 2022-06-21 ENCOUNTER — TRANSCRIPTION ENCOUNTER (OUTPATIENT)
Age: 62
End: 2022-06-21

## 2022-06-21 ENCOUNTER — APPOINTMENT (OUTPATIENT)
Dept: ORTHOPEDIC SURGERY | Facility: CLINIC | Age: 62
End: 2022-06-21
Payer: MEDICARE

## 2022-06-21 VITALS — HEIGHT: 70 IN | BODY MASS INDEX: 37.22 KG/M2 | WEIGHT: 260 LBS

## 2022-06-21 DIAGNOSIS — M17.12 UNILATERAL PRIMARY OSTEOARTHRITIS, LEFT KNEE: ICD-10-CM

## 2022-06-21 DIAGNOSIS — M17.11 UNILATERAL PRIMARY OSTEOARTHRITIS, RIGHT KNEE: ICD-10-CM

## 2022-06-21 PROCEDURE — 73564 X-RAY EXAM KNEE 4 OR MORE: CPT | Mod: 50

## 2022-06-21 PROCEDURE — 99204 OFFICE O/P NEW MOD 45 MIN: CPT | Mod: 25

## 2022-06-21 PROCEDURE — 20610 DRAIN/INJ JOINT/BURSA W/O US: CPT | Mod: 50

## 2022-06-21 PROCEDURE — J3490M: CUSTOM

## 2022-06-21 NOTE — IMAGING

## 2022-06-21 NOTE — HISTORY OF PRESENT ILLNESS
[de-identified] : 61 year old female  (RHD, retired) chronic B/L knee pain worseing since 6/17/22 but has been intermittent since 2017. Pain is over anterior and posterior aspects bilaterally, ambulates w/ a cane. Has had tx in the past of CSI, gel injection therapy, aspiration, PT.

## 2022-09-16 NOTE — PHYSICAL EXAM
[General Appearance - In No Acute Distress] : no acute distress [Neck Cervical Mass (___cm)] : no neck mass was observed [Heart Sounds] : normal S1 and S2 [Auscultation Breath Sounds / Voice Sounds] : lungs were clear to auscultation bilaterally [Abnormal Walk] : normal gait [Nail Clubbing] : no clubbing of the fingernails [Cyanosis, Localized] : no localized cyanosis [] : no rash [No Focal Deficits] : no focal deficits [Oriented To Time, Place, And Person] : oriented to person, place, and time [Impaired Insight] : insight and judgment were intact [Erythema] : no erythema of the pharynx [id number]

## 2022-10-28 NOTE — H&P PST ADULT - PSH
Tylenol #3 Pending    Insurance response  Prescription Drug Insurance: [employer]  Notes: Prior authorization submitted - will update provider when decision has been made by insurance.            bunionectomy  b/L 2007 bunionectomy  b/L 2007  H/O carpal tunnel repair  and trigger finger repair (L) 5/2017

## 2022-11-12 ENCOUNTER — EMERGENCY (EMERGENCY)
Facility: HOSPITAL | Age: 62
LOS: 1 days | Discharge: HOME | End: 2022-11-12
Attending: STUDENT IN AN ORGANIZED HEALTH CARE EDUCATION/TRAINING PROGRAM | Admitting: STUDENT IN AN ORGANIZED HEALTH CARE EDUCATION/TRAINING PROGRAM

## 2022-11-12 VITALS
HEART RATE: 79 BPM | SYSTOLIC BLOOD PRESSURE: 121 MMHG | DIASTOLIC BLOOD PRESSURE: 65 MMHG | TEMPERATURE: 99 F | OXYGEN SATURATION: 96 % | RESPIRATION RATE: 16 BRPM

## 2022-11-12 DIAGNOSIS — Z98.890 OTHER SPECIFIED POSTPROCEDURAL STATES: Chronic | ICD-10-CM

## 2022-11-12 DIAGNOSIS — S60.511A ABRASION OF RIGHT HAND, INITIAL ENCOUNTER: Chronic | ICD-10-CM

## 2022-11-12 LAB
ALBUMIN SERPL ELPH-MCNC: 4.3 G/DL — SIGNIFICANT CHANGE UP (ref 3.3–5)
ALP SERPL-CCNC: 59 U/L — SIGNIFICANT CHANGE UP (ref 40–120)
ALT FLD-CCNC: 68 U/L — HIGH (ref 4–41)
ANION GAP SERPL CALC-SCNC: 14 MMOL/L — SIGNIFICANT CHANGE UP (ref 7–14)
APPEARANCE UR: CLEAR — SIGNIFICANT CHANGE UP
AST SERPL-CCNC: 140 U/L — HIGH (ref 4–40)
BACTERIA # UR AUTO: NEGATIVE — SIGNIFICANT CHANGE UP
BASOPHILS # BLD AUTO: 0.03 K/UL — SIGNIFICANT CHANGE UP (ref 0–0.2)
BASOPHILS NFR BLD AUTO: 0.3 % — SIGNIFICANT CHANGE UP (ref 0–2)
BILIRUB SERPL-MCNC: 0.4 MG/DL — SIGNIFICANT CHANGE UP (ref 0.2–1.2)
BILIRUB UR-MCNC: NEGATIVE — SIGNIFICANT CHANGE UP
BUN SERPL-MCNC: 19 MG/DL — SIGNIFICANT CHANGE UP (ref 7–23)
CALCIUM SERPL-MCNC: 9.7 MG/DL — SIGNIFICANT CHANGE UP (ref 8.4–10.5)
CHLORIDE SERPL-SCNC: 103 MMOL/L — SIGNIFICANT CHANGE UP (ref 98–107)
CO2 SERPL-SCNC: 23 MMOL/L — SIGNIFICANT CHANGE UP (ref 22–31)
COLOR SPEC: YELLOW — SIGNIFICANT CHANGE UP
CREAT SERPL-MCNC: 0.66 MG/DL — SIGNIFICANT CHANGE UP (ref 0.5–1.3)
DIFF PNL FLD: NEGATIVE — SIGNIFICANT CHANGE UP
EGFR: 106 ML/MIN/1.73M2 — SIGNIFICANT CHANGE UP
EOSINOPHIL # BLD AUTO: 0.22 K/UL — SIGNIFICANT CHANGE UP (ref 0–0.5)
EOSINOPHIL NFR BLD AUTO: 2.4 % — SIGNIFICANT CHANGE UP (ref 0–6)
EPI CELLS # UR: 1 /HPF — SIGNIFICANT CHANGE UP (ref 0–5)
GLUCOSE SERPL-MCNC: 94 MG/DL — SIGNIFICANT CHANGE UP (ref 70–99)
GLUCOSE UR QL: NEGATIVE — SIGNIFICANT CHANGE UP
HCT VFR BLD CALC: 43.1 % — SIGNIFICANT CHANGE UP (ref 39–50)
HGB BLD-MCNC: 13.9 G/DL — SIGNIFICANT CHANGE UP (ref 13–17)
HYALINE CASTS # UR AUTO: 1 /LPF — SIGNIFICANT CHANGE UP (ref 0–7)
IANC: 6.12 K/UL — SIGNIFICANT CHANGE UP (ref 1.8–7.4)
IMM GRANULOCYTES NFR BLD AUTO: 0.2 % — SIGNIFICANT CHANGE UP (ref 0–0.9)
KETONES UR-MCNC: NEGATIVE — SIGNIFICANT CHANGE UP
LEUKOCYTE ESTERASE UR-ACNC: NEGATIVE — SIGNIFICANT CHANGE UP
LYMPHOCYTES # BLD AUTO: 1.84 K/UL — SIGNIFICANT CHANGE UP (ref 1–3.3)
LYMPHOCYTES # BLD AUTO: 20.4 % — SIGNIFICANT CHANGE UP (ref 13–44)
MCHC RBC-ENTMCNC: 28.7 PG — SIGNIFICANT CHANGE UP (ref 27–34)
MCHC RBC-ENTMCNC: 32.3 GM/DL — SIGNIFICANT CHANGE UP (ref 32–36)
MCV RBC AUTO: 89 FL — SIGNIFICANT CHANGE UP (ref 80–100)
MONOCYTES # BLD AUTO: 0.81 K/UL — SIGNIFICANT CHANGE UP (ref 0–0.9)
MONOCYTES NFR BLD AUTO: 9 % — SIGNIFICANT CHANGE UP (ref 2–14)
NEUTROPHILS # BLD AUTO: 6.12 K/UL — SIGNIFICANT CHANGE UP (ref 1.8–7.4)
NEUTROPHILS NFR BLD AUTO: 67.7 % — SIGNIFICANT CHANGE UP (ref 43–77)
NITRITE UR-MCNC: NEGATIVE — SIGNIFICANT CHANGE UP
NRBC # BLD: 0 /100 WBCS — SIGNIFICANT CHANGE UP (ref 0–0)
NRBC # FLD: 0 K/UL — SIGNIFICANT CHANGE UP (ref 0–0)
PH UR: 6 — SIGNIFICANT CHANGE UP (ref 5–8)
PLATELET # BLD AUTO: 204 K/UL — SIGNIFICANT CHANGE UP (ref 150–400)
POTASSIUM SERPL-MCNC: 4.1 MMOL/L — SIGNIFICANT CHANGE UP (ref 3.5–5.3)
POTASSIUM SERPL-SCNC: 4.1 MMOL/L — SIGNIFICANT CHANGE UP (ref 3.5–5.3)
PROT SERPL-MCNC: 6.9 G/DL — SIGNIFICANT CHANGE UP (ref 6–8.3)
PROT UR-MCNC: ABNORMAL
RBC # BLD: 4.84 M/UL — SIGNIFICANT CHANGE UP (ref 4.2–5.8)
RBC # FLD: 13 % — SIGNIFICANT CHANGE UP (ref 10.3–14.5)
RBC CASTS # UR COMP ASSIST: 5 /HPF — HIGH (ref 0–4)
SODIUM SERPL-SCNC: 140 MMOL/L — SIGNIFICANT CHANGE UP (ref 135–145)
SP GR SPEC: 1.04 — SIGNIFICANT CHANGE UP (ref 1.01–1.05)
UROBILINOGEN FLD QL: ABNORMAL
WBC # BLD: 9.04 K/UL — SIGNIFICANT CHANGE UP (ref 3.8–10.5)
WBC # FLD AUTO: 9.04 K/UL — SIGNIFICANT CHANGE UP (ref 3.8–10.5)
WBC UR QL: 2 /HPF — SIGNIFICANT CHANGE UP (ref 0–5)

## 2022-11-12 PROCEDURE — 99284 EMERGENCY DEPT VISIT MOD MDM: CPT | Mod: GC

## 2022-11-12 RX ORDER — TAMSULOSIN HYDROCHLORIDE 0.4 MG/1
0.8 CAPSULE ORAL ONCE
Refills: 0 | Status: COMPLETED | OUTPATIENT
Start: 2022-11-12 | End: 2022-11-12

## 2022-11-12 RX ORDER — TAMSULOSIN HYDROCHLORIDE 0.4 MG/1
1 CAPSULE ORAL
Qty: 7 | Refills: 0
Start: 2022-11-12 | End: 2022-11-18

## 2022-11-12 RX ADMIN — TAMSULOSIN HYDROCHLORIDE 0.8 MILLIGRAM(S): 0.4 CAPSULE ORAL at 16:22

## 2022-11-12 NOTE — ED ADULT TRIAGE NOTE - CHIEF COMPLAINT QUOTE
c/o difficulty urinating x 2 days. States urine trickles but not emptying bladder completely. Denies any fever/chills or dysuria. Pt feel lower abdominal pressure, abdomen soft. Hx HTN c/o difficulty urinating x 2 days. States urine trickles but not emptying bladder completely. Denies any fever/chills or dysuria. Pt feel lower abdominal pressure, abdomen soft. Hx HTN, chronic back pain

## 2022-11-12 NOTE — ED PROVIDER NOTE - PHYSICAL EXAMINATION
Gen: WDWN, NAD, comfortable appearing, afebrile   HEENT: No nasal discharge, mucous membranes moist  CV: RRR, +S1/S2, no M/R/G, equal b/l radial pulses 2+  Resp: CTAB, no W/R/R, no increased WOB   GI: Abdomen soft non-distended, suprapubic fullness with mild TTP with no rebound/guarding, no masses/organomegaly; POCUS bladder shows around 350cc in bladder    MSK/Skin: No CVA tenderness, no open wounds, no bruising, no LE edema  Neuro: A&Ox4, moving all 4 extremities spontaneously, gross sensation intact in UE and LE BL  Psych: appropriate mood

## 2022-11-12 NOTE — ED ADULT NURSE NOTE - CHIEF COMPLAINT QUOTE
c/o difficulty urinating x 2 days. States urine trickles but not emptying bladder completely. Denies any fever/chills or dysuria. Pt feel lower abdominal pressure, abdomen soft. Hx HTN, chronic back pain

## 2022-11-12 NOTE — ED ADULT NURSE NOTE - NS ED NURSE LEVEL OF CONSCIOUSNESS ORIENTATION
Addended by: FLORENCIA QUINTANILLA on: 6/14/2022 05:35 AM     Modules accepted: Orders    
Oriented - self; Oriented - place; Oriented - time

## 2022-11-12 NOTE — ED PROVIDER NOTE - NSICDXPASTMEDICALHX_GEN_ALL_CORE_FT
PAST MEDICAL HISTORY:  Arthritis     Asthma Pt reports he has never been hospitalized for asthma, he used inhaler 1 yr ago    Bunion     Hammer toe     Hip pain, chronic, right     HTN (hypertension)     Localized swelling, mass and lump, left upper limb     Lumbar back pain     Obesity     Palmar fascial fibromatosis [dupuytren]     Rotator cuff disorder, left s/p surgery 2017    Trigger finger, left little finger

## 2022-11-12 NOTE — ED PROVIDER NOTE - OBJECTIVE STATEMENT
63 y/o male with pmhx of HTN and COPD presenting with urinary retention. Patient reports that for past 2 days he has been unable to urinate.  Feels associated abdominal distention/fullness and denies any dysuria or hematuria or back pain.  Has urology appointment scheduled for next week but no known history of prostate issues or urinary retention in the past.  No fever/chills, nausea/vomiting/diarrhea, cough, rashes.  Denies any new back pain (chronic back pain), saddle anesthesia, fecal/urinary incontinence.

## 2022-11-12 NOTE — ED ADULT NURSE NOTE - NSICDXPASTSURGICALHX_GEN_ALL_CORE_FT
PAST SURGICAL HISTORY:  Abrasion of right hand, initial encounter s/p excison of glass right hand    bunionectomy b/L 2007    H/O carpal tunnel repair and trigger finger repair (L) 5/2017    S/P rotator cuff surgery 2017    S/P tendon repair nerve and tendon repair left hand

## 2022-11-12 NOTE — ED PROVIDER NOTE - DISPOSITION TYPE
DISCHARGE
Implemented All Universal Safety Interventions:  [place] to call system. Call bell, personal items and telephone within reach. Instruct patient to call for assistance. Room bathroom lighting operational. Non-slip footwear when patient is off stretcher. Physically safe environment: no spills, clutter or unnecessary equipment. Stretcher in lowest position, wheels locked, appropriate side rails in place.

## 2022-11-12 NOTE — ED PROVIDER NOTE - PROGRESS NOTE DETAILS
Yunior, PGY-3  UA with no evidence of infection. Labs non-actionable. Matos drained around 900cc. Will d/c with leg bag and urology f/u

## 2022-11-12 NOTE — ED PROVIDER NOTE - ATTENDING CONTRIBUTION TO CARE
62-year-old male with past medical history of hypertension tobacco use disorder chronic back pain presenting with urinary retention for 2 days patient states he has been able to urinate only little bits feels like he needs to go.  He denies fever chills nausea vomiting chest pain.  Patient states she is supposed to follow-up with a urologist in 1 week.  On exam patient with tenderness in the lower abdomen bedside ultrasound showing enlarged bladder with greater than 300 cc of fluid in it.  Patient denies nausea vomiting fever chills diarrhea able to ambulate without issues.  denies fever, chills, chest pain, SOB, abdominal pain, diarrhea,+ dysuria,+ difficulty with urination,  syncope, bleeding, new rash,weakness, numbness, blurred vision    ROS  otherwise negative as per HPI  Gen: Awake, Alert, WD, WN, NAD  Head:  NC/AT  Eyes:  PERRL, EOMI, Conjunctiva pink, lids normal, no scleral icterus  ENT: OP clear, , moist mucus membranes  Neck: supple, nontender, no meningismus, no JVD, trachea midline  Cardiac/CV:  S1 S2, RRR, no M/G/R  Respiratory/Pulm:  CTAB, good air movement, normal resp effort, no wheezes/stridor/retractions/rales/rhonchi  Gastrointestinal/Abdomen:  Soft, suprapubic fullness, nondistended, +BS, no rebound/guarding  Back:  no CVAT, no MLT  Ext:  warm, well perfused, moving all extremities spontaneously, no peripheral edema, distal pulses intact  Skin: intact, no rash  Neuro:  AAOx3, sensation intact, motor 5/5 x 4 extremities, normal gait, speech clear

## 2022-11-12 NOTE — ED ADULT NURSE NOTE - OBJECTIVE STATEMENT
Received pt in room 29. pt A&OX3, ambulatory, family at bedside. pt with hx of HTN, COPD. pt c/o urinary retention states for the last 2 days cannot urinate. also c/o abdominal distention. appears uncomfortable but in no distress. respirations are equal and nonlabored, no respiratory distress noted. denies any hematuria, fever, headache, dizziness, chest pain, sob, n/v/d. 20 gauge iv placed in the right hand, labs sent. 14 [place] Matos placed using sterile technique, drained 1100cc's of yellow urine, urine sent. pt medicated as per orders. safety maintained, side rails up. will continue to reassess and monitor.

## 2022-11-12 NOTE — ED ADULT NURSE NOTE - NSFALLRSKASSESSDT_ED_ALL_ED
12-Nov-2022 16:22 Complex Repair And Modified Advancement Flap Text: The defect edges were debeveled with a #15 scalpel blade.  The primary defect was closed partially with a complex linear closure.  Given the location of the remaining defect, shape of the defect and the proximity to free margins a modified advancement flap was deemed most appropriate for complete closure of the defect.  Using a sterile surgical marker, an appropriate advancement flap was drawn incorporating the defect and placing the expected incisions within the relaxed skin tension lines where possible.    The area thus outlined was incised deep to adipose tissue with a #15 scalpel blade.  The skin margins were undermined to an appropriate distance in all directions utilizing iris scissors.

## 2022-11-12 NOTE — ED PROVIDER NOTE - NSFOLLOWUPINSTRUCTIONS_ED_ALL_ED_FT
Please follow up with the Urology team with the information attached.     Contact a health care provider if:    •You have uncomfortable bladder contractions that you cannot control (spasms).      •You leak urine with the spasms.        Get help right away if:    •You have chills or a fever.      •You have blood in your urine.    •You have a catheter and the following happens:  •Your catheter stops draining urine.      •Your catheter falls out.      •Acute urinary retention is a condition in which a person is unable to pass urine or can only pass a little urine. If left untreated, this condition can result in kidney damage or other serious complications.      •An enlarged prostate may cause this condition. As men age, their prostate gland may become larger and may press or squeeze on the bladder or the urethra.      •Treatment for this condition may include medicines and placement of an indwelling urinary catheter.      •Monitor any changes in your symptoms. Tell your health care provider about any changes.

## 2022-11-12 NOTE — ED PROVIDER NOTE - PATIENT PORTAL LINK FT
You can access the FollowMyHealth Patient Portal offered by [facility] by registering at the following website: [email or web address]. By joining [employer]’s FollowMyHealth portal, you will also be able to view your health information using other applications (apps) compatible with our system.

## 2022-11-12 NOTE — ED ADULT NURSE NOTE - NS ED NURSE RECORD ANOTHER VITAL SIGN
"Chief Complaint  Follow-up, Hyperlipidemia, and Hypertension    Subjective          Dennis Ryan presents to [facility]  Patient presents for follow-up of chronic conditions.  This is a 76-year-old male.  I saw him last on 5/21/2019    He has hypertension treated with ramipril 5 mg daily and reports good compliance with this medication.  Blood pressure is high in the office today at 166/84.  He does not routinely check his blood pressure at home but has had no headaches, visual changes, shortness of breath or chest discomfort.    He has hyperlipidemia treated with atorvastatin 20 mg daily, reports good compliance with this medication.    He has coronary artery disease and follows with Dr. Lagos, cardiology.  He has not been seen since 2019 with cardiology and needs to make a follow-up appointment.    Overall states that he is feeling very well, denies development of any other new issues today.      Objective   Vital Signs:   /84 (BP Location: Right arm, Patient Position: Sitting, Cuff Size: Adult)   Pulse 67   Temp 97.8 °F (36.6 °C)   Resp 17   Ht 175.3 cm (69\")   Wt 83.9 kg (185 lb)   SpO2 98%   BMI 27.32 kg/m²     Physical Exam  Vitals and nursing note reviewed.   Constitutional:       General: He is not in acute distress.     Appearance: Normal appearance. He is well-developed. He is not ill-appearing, toxic-appearing or diaphoretic.   HENT:      Head: Normocephalic and atraumatic.      Right Ear: Tympanic membrane, ear canal and external ear normal.      Left Ear: Tympanic membrane, ear canal and external ear normal.   Eyes:      Pupils: Pupils are equal, round, and reactive to light.   Neck:      Vascular: No carotid bruit.   Cardiovascular:      Rate and Rhythm: Normal rate and regular rhythm.      Pulses: Normal pulses.      Heart sounds: Normal heart sounds.      Comments: No peripheral edema  Pulmonary:      Effort: Pulmonary effort is normal. No respiratory " distress.      Breath sounds: Normal breath sounds. No stridor. No wheezing, rhonchi or rales.   Chest:      Chest wall: No tenderness.   Abdominal:      General: Bowel sounds are normal. There is no distension.      Palpations: Abdomen is soft. There is no mass.      Tenderness: There is no abdominal tenderness. There is no right CVA tenderness, left CVA tenderness, guarding or rebound.      Hernia: No hernia is present.   Musculoskeletal:         General: Normal range of motion.      Cervical back: Normal range of motion and neck supple. No rigidity or tenderness.   Lymphadenopathy:      Cervical: No cervical adenopathy.   Skin:     General: Skin is warm and dry.      Capillary Refill: Capillary refill takes less than 2 seconds.   Neurological:      General: No focal deficit present.      Mental Status: He is alert and oriented to person, place, and time. Mental status is at baseline.   Psychiatric:         Mood and Affect: Mood normal.         Behavior: Behavior normal.         Thought Content: Thought content normal.         Judgment: Judgment normal.        Result Review :   The following data was reviewed by: SEPIDEH Mathew on 04/01/2021:      Data reviewed: Consultant notes Cardiology, Dr. Lagos     Current outpatient and discharge medications have been reconciled for the patient.  Reviewed by: SEPIDEH Mathew      Office Visit with Juliann Lagos MD (11/05/2019)       Assessment and Plan    Diagnoses and all orders for this visit:    1. Hypertension with heart disease (Primary)  Assessment & Plan:  Blood pressure is elevated in the office today at 166/84.  I have asked him to begin consistently monitoring his blood pressure at home, goal of 130/80 or lower is discussed.  DASH diet as well as regular exercise, 20 to 30 minutes/day 4 to 5 days/week.  Continue ramipril 5 mg daily, refill is provided.  I have asked him to keep a log of daily home blood pressures and in 2 weeks to send me these  readings.    Orders:  -     CBC No Differential  -     Comprehensive metabolic panel  -     TSH Rfx On Abnormal To Free T4  -     ramipril (ALTACE) 5 MG capsule; Take 1 capsule by mouth Daily.  Dispense: 90 capsule; Refill: 1    2. Coronary artery disease involving native coronary artery of native heart without angina pectoris  Assessment & Plan:  He denies any recent cardiac symptoms.  I encouraged him to make an appointment for cardiac follow-up with his cardiologist, Dr. Lagos.  He acknowledges the importance of this and will call to make a follow-up appointment.  He will continue with 81 mg aspirin daily.      3. Mixed hyperlipidemia  Assessment & Plan:  Continue atorvastatin 20 mg daily.  Lipid panel today and we will adjust therapy if needed.    Orders:  -     Lipid panel  -     atorvastatin (LIPITOR) 20 MG tablet; Take 1 tablet by mouth Daily.  Dispense: 90 tablet; Refill: 1    4. Healthcare maintenance  Comments:  Declines colonoscopy despite education.    One-time hep C screen.    He sees Dr. Miranda, urology regularly for PSA.  Orders:  -     Hepatitis C antibody    5. Essential hypertension  Assessment & Plan:  Blood pressure is elevated in the office today at 166/84.  I have asked him to begin consistently monitoring his blood pressure at home, goal of 130/80 or lower is discussed.  DASH diet as well as regular exercise, 20 to 30 minutes/day 4 to 5 days/week.  Continue ramipril 5 mg daily, refill is provided.  I have asked him to keep a log of daily home blood pressures and in 2 weeks to send me these readings.    Orders:  -     TSH Rfx On Abnormal To Free T4    Other orders  -     Cancel: PSA DIAGNOSTIC ONLY      We will contact patient with lab results and any further recommendations.  Follow-up as needed and I will see him back in 6 months for recheck of chronic conditions/routine health maintenance.    Follow Up   Return in about 6 months (around 10/1/2021).  Patient was given instructions and  counseling regarding his condition or for health maintenance advice. Please see specific information pulled into the AVS if appropriate.        Yes

## 2022-11-13 ENCOUNTER — EMERGENCY (EMERGENCY)
Facility: HOSPITAL | Age: 62
LOS: 1 days | Discharge: HOME | End: 2022-11-13
Attending: STUDENT IN AN ORGANIZED HEALTH CARE EDUCATION/TRAINING PROGRAM | Admitting: STUDENT IN AN ORGANIZED HEALTH CARE EDUCATION/TRAINING PROGRAM

## 2022-11-13 VITALS
DIASTOLIC BLOOD PRESSURE: 59 MMHG | SYSTOLIC BLOOD PRESSURE: 118 MMHG | HEART RATE: 978 BPM | OXYGEN SATURATION: 100 % | TEMPERATURE: 97 F | RESPIRATION RATE: 16 BRPM

## 2022-11-13 DIAGNOSIS — Z98.890 OTHER SPECIFIED POSTPROCEDURAL STATES: Chronic | ICD-10-CM

## 2022-11-13 DIAGNOSIS — S60.511A ABRASION OF RIGHT HAND, INITIAL ENCOUNTER: Chronic | ICD-10-CM

## 2022-11-13 LAB
CULTURE RESULTS: NO GROWTH — SIGNIFICANT CHANGE UP
SPECIMEN SOURCE: SIGNIFICANT CHANGE UP

## 2022-11-13 PROCEDURE — 99284 EMERGENCY DEPT VISIT MOD MDM: CPT

## 2022-11-13 NOTE — ED ADULT TRIAGE NOTE - CHIEF COMPLAINT QUOTE
Pt c/o of hematuria started tonight, was seen yesterday for urinary retention a Matos cathter was placed. Endorses leg bag draining at home. Leg bag draining with dark tea colored urine. Denies fever, chills, suprapubic pain pain, blood thinner use. No PMHx.

## 2022-11-13 NOTE — ED PROVIDER NOTE - CLINICAL SUMMARY MEDICAL DECISION MAKING FREE TEXT BOX
63 y/o male pmh dm copd, was seen in ER yesterday for urinary retention, labs, ua wnl, d/c with sandhu, leg bag, instructed to f/u with urology, now returns as he has been having hematuria x few hours, denies any trauma to the penis or abdomen, not on any blood thinners, denies any HA, neck pain, cough, f/c/n/v/d, chest pain, sob, abdominal pain, dysuria, numbness/weakness/tingling, recent travel, sick contact, social history  on exam : + bloody urine in bag  POCUS shows ballon in bladder  will flush sandhu and reasses

## 2022-11-13 NOTE — ED PROVIDER NOTE - OBJECTIVE STATEMENT
63 y/o male pmh dm copd, was seen in ER yesterday for urinary retention, labs, ua wnl, d/c with sandhu, leg bag, instructed to f/u with urology, now returns as he has been having hematuria x few hours, denies any trauma to the penis or abdomen, not on any blood thinners, denies any HA, neck pain, cough, f/c/n/v/d, chest pain, sob, abdominal pain, dysuria, numbness/weakness/tingling, recent travel, sick contact, social history

## 2022-11-13 NOTE — ED PROVIDER NOTE - NS ED MD DISPO DISCHARGE CCDA
Outpatient Medications Marked as Taking for the 4/13/20 encounter (Refill) with Jasper Leavitt MD   Medication Sig Dispense Refill   • pioglitazone (ACTOS) 30 MG tablet 1 QAM AC 30 tablet 6     Last refilled   Last Visit: ***  Next Visit: Visit date not found    Labs:   {GGB Labs:165775}    {ggb refill:107638}   Patient/Caregiver provided printed discharge information.

## 2022-11-13 NOTE — ED PROVIDER NOTE - NS_EDPROVIDERDISPOUSERTYPE_ED_A_ED
Paperwork received from [employer] requesting order for [employer] Vidal CGM and last two office visit notes. Paperwork completed and signed by Dr. Blandon. Faxed back to [phone]    LOV 05/01/20 and 01/29/20  
Attending Attestation (For Attendings USE Only)...

## 2022-11-13 NOTE — ED PROVIDER NOTE - PROGRESS NOTE DETAILS
Rodriguez Ga DO: Patient without any symptoms at this time.  Urine in bag clear yellow with minimal blood tinge.  Matos balloon in bladder on POCUS.  Will DC with neuro follow-up this week.  Return precautions discussed with patient and wife at bedside and they expressed understanding stable for DC

## 2022-11-13 NOTE — ED PROVIDER NOTE - NSFOLLOWUPINSTRUCTIONS_ED_ALL_ED_FT
1) Please follow-up with Urology in the next 2-3 days.  Please call today for an appointment.   2) If you have any worsening of symptoms or any other concerns please return to the ED immediately.  3) You may have been given a copy of your labs and/or imaging.  Please go over these with your primary care doctor.      Indwelling Urinary Catheter Care, Adult      An indwelling urinary catheter is a thin, flexible, germ-free (sterile) tube that is placed into the bladder to help drain urine out of the body. The catheter is inserted into the part of the body that drains urine from the bladder (urethra). Urine drains from the catheter into a drainage bag outside of the body.    Taking good care of your catheter will keep it working properly and help to prevent problems from developing.      What are the risks?    •Bacteria may get into your bladder and cause a urinary tract infection.      •Urine flow can become blocked. This can happen if the catheter is not working correctly, or if you have sediment or a blood clot in your bladder or the catheter.      •Tissue near the catheter may become irritated and bleed.        How to wear your catheter and your drainage bag    Supplies needed     •Adhesive tape or a leg strap.      •Alcohol wipe or soap and water (if you use tape).      •A clean towel (if you use tape).      •Overnight drainage bag.      •Smaller drainage bag (leg bag).      Wearing your catheter and bag     Use adhesive tape or a leg strap to attach your catheter to your leg.  •Make sure the catheter is not pulled tight.      •If a leg strap gets wet, replace it with a dry one.    •If you use adhesive tape:  1.Use an alcohol wipe or soap and water to wash off any stickiness on your skin where you had tape before.      2.Use a clean towel to pat-dry the area.      3.Apply the new tape.        You should have received a large overnight drainage bag and a smaller leg bag that fits underneath clothing.   •You may wear the overnight bag at any time, but you should not wear the leg bag at night.      •Always wear the leg bag below your knee.      •Make sure the overnight drainage bag is always lower than the level of your bladder, but do not let it touch the floor. Before you go to sleep, hang the bag inside a wastebasket that is covered by a clean plastic bag.        How to care for your skin around the catheter      Female anatomy showing the bladder, labia, and urethra and an indwelling urinary catheter in the bladder.       Male anatomy showing the bladder, penis, and urethra and an indwelling urinary catheter in the bladder.     Supplies needed     •A clean washcloth.      •Water and mild soap.      •A clean towel.      Caring for your skin and catheter   •Every day, use a clean washcloth and soapy water to clean the skin around your catheter.  1.Wash your hands with soap and water.      2.Wet a washcloth in warm water and mild soap.    3.Clean the skin around your urethra.•If you are female:  •Use one hand to gently spread the folds of skin around your vagina (labia).      •With the washcloth in your other hand, wipe the inner side of your labia on each side. Do this in a front-to-back direction.      •If you are male:  •Use one hand to pull back any skin that covers the end of your penis (foreskin).      •With the washcloth in your other hand, wipe your penis in small circles. Start wiping at the tip of your penis, then move outward from the catheter.      •Move the foreskin back in place, if this applies.          4.With your free hand, hold the catheter close to where it enters your body. Keep holding the catheter during cleaning so it does not get pulled out.    5.Use your other hand to clean the catheter with the washcloth.  •Only wipe downward on the catheter, toward the bag.      •Do not wipe upward toward your body, because that may push bacteria into your urethra and cause infection.        6.Use a clean towel to pat-dry the catheter and the skin around it. Make sure to wipe off all soap.      7.Wash your hands with soap and water.        •Shower every day. Do not take baths.      • Do not use cream, ointment, or lotion on the area where the catheter enters your body, unless your health care provider tells you to do that.      • Do not use powders, sprays, or lotions on your genital area.    •Check your skin around the catheter every day for signs of infection. Check for:  •Redness, swelling, or pain.      •Fluid or blood.      •Warmth.      •Pus or a bad smell.          How to empty the drainage bag    Supplies needed     •Rubbing alcohol.      •Gauze pad or cotton ball.      •Adhesive tape or a leg strap.      Emptying the bag     Empty your drainage bag (your overnight drainage bag or your leg bag) when it is ?–½ full, or at least 2–3 times a day. Clean the drainage bag according to the 's instructions or as told by your health care provider.  1.Wash your hands with soap and water.      2.Detach the drainage bag from your leg.      3.Hold the drainage bag over the toilet or a clean container. Make sure the drainage bag is lower than your hips and bladder. This stops urine from going back into the tubing and into your bladder.      4.Open the pour spout at the bottom of the bag.      5.Empty the urine into the toilet or container. Do not let the pour spout touch any surface. This precaution is important to prevent bacteria from getting in the bag and causing infection.      6.Apply rubbing alcohol to a gauze pad or cotton ball.      7.Use the gauze pad or cotton ball to clean the pour spout.      8.Close the pour spout.      9.Attach the bag to your leg with adhesive tape or a leg strap.      10.Wash your hands with soap and water.        How to change the drainage bag    Supplies needed:     •Alcohol wipes.      •A clean drainage bag.      •Adhesive tape or a leg strap.      Changing the bag     Replace your drainage bag with a clean bag if it leaks, starts to smell bad, or looks dirty.  1.Wash your hands with soap and water.      2.Detach the dirty drainage bag from your leg.      3.Pinch the catheter with your fingers so that urine does not spill out.      4.Disconnect the catheter tube from the drainage tube at the connection valve. Do not let the tubes touch any surface.      5.Clean the end of the catheter tube with an alcohol wipe. Use a different alcohol wipe to clean the end of the drainage tube.      6.Connect the catheter tube to the drainage tube of the clean bag.      7.Attach the clean bag to your leg with adhesive tape or a leg strap. Avoid attaching the new bag too tightly.      8.Wash your hands with soap and water.        General instructions  Washing hands with soap and water.   • Never pull on your catheter or try to remove it. Pulling can damage your internal tissues.      •Always wash your hands before and after you handle your catheter or drainage bag. Use a mild, fragrance-free soap. If soap and water are not available, use hand .      •Always make sure there are no twists or bends (kinks) in the catheter tube.      •Always make sure there are no leaks in the catheter or drainage bag.      •Drink enough fluid to keep your urine pale yellow.      • Do not take baths, swim, or use a hot tub.      •If you are female, wipe from front to back after having a bowel movement.        Contact a health care provider if:    •Your urine is cloudy.      •Your urine smells unusually bad.      •Your catheter gets clogged.      •Your catheter starts to leak.      •Your bladder feels full.        Get help right away if:    •You have redness, swelling, or pain where the catheter enters your body.      •You have fluid, blood, pus, or a bad smell coming from the area where the catheter enters your body.      •The area where the catheter enters your body feels warm to the touch.      •You have a fever.      •You have pain in your abdomen, legs, lower back, or bladder.      •You see blood in the catheter.      •Your urine is pink or red.      •You have nausea, vomiting, or chills.      •Your urine is not draining into the bag.      •Your catheter gets pulled out.        Summary    •An indwelling urinary catheter is a thin, flexible, germ-free (sterile) tube that is placed into the bladder to help drain urine out of the body.      •The catheter is inserted into the part of the body that drains urine from the bladder (urethra).      •Take good care of your catheter to keep it working properly and help prevent problems from developing.      •Always wash your hands before and after you handle your catheter or drainage bag.      • Never pull on your catheter or try to remove it.      This information is not intended to replace advice given to you by your health care provider. Make sure you discuss any questions you have with your health care provider.

## 2022-11-13 NOTE — ED PROVIDER NOTE - NS ED ATTENDING STATEMENT MOD
This was a shared visit with the TEE. I reviewed and verified the documentation and independently performed the documented:

## 2022-11-13 NOTE — ED PROVIDER NOTE - ATTENDING APP SHARED VISIT CONTRIBUTION OF CARE
62-year-old male past medical history diabetes, chronic joint pain questionable COPD presented yesterday to the ED for urinary retention and had a Matos placed returns today for blood and leg bag time past several hours.  Denies any trauma traction on the catheter.  Denies any penile/scrotal pain or abdominal pain.  Denies any fevers, chills, chest pain, shortness of breath, lower back pain, nausea, vomiting.  Patient and wife report no past history of urinary retention hematuria or prostate related issues to their knowledge.  Exam as above  Matos flushed with clearing of blood patient without any symptoms in ED.  We will have discharge hydrocortamate rapid urology follow-up return precautions discussed stable for DC

## 2022-11-13 NOTE — ED PROVIDER NOTE - NSPTACCESSSVCSAPPTDETAILS_ED_ALL_ED_FT
urinary retention and hematuria. patient has a sandhu catheter. urinary retention and hematuria. patient has a sandhu catheter.  [id number] or [id number]

## 2022-11-13 NOTE — ED ADULT NURSE NOTE - OBJECTIVE STATEMENT
Pt received in intake room 3. Pt A&Ox4, pmhx of copd, dm, c/o hematuria for a few hours pta. Was seen in ED yesterday for urinary retention and D/C'd with sandhu and leg bad and to follow up with urology outpatient. Denies any trauma to area or blood thinners. Denies chest pain, sob, abd pain, n/v/d, fevers/chills. Respirations even and unlabored, no accessory muscle use. Sandhu flushed and new leg bag placed. MD lau at bedside.

## 2022-11-13 NOTE — ED PROVIDER NOTE - PATIENT PORTAL LINK FT
You can access the FollowMyHealth Patient Portal offered by [facility] by registering at the following website: [email or web address]. By joining [employer]’s FollowMyHealth portal, you will also be able to view your health information using other applications (apps) compatible with our system.

## 2022-11-14 VITALS
OXYGEN SATURATION: 100 % | TEMPERATURE: 97 F | RESPIRATION RATE: 18 BRPM | SYSTOLIC BLOOD PRESSURE: 119 MMHG | DIASTOLIC BLOOD PRESSURE: 60 MMHG | HEART RATE: 75 BPM

## 2022-11-15 ENCOUNTER — APPOINTMENT (OUTPATIENT)
Dept: UROLOGY | Facility: CLINIC | Age: 62
End: 2022-11-15

## 2022-11-15 VITALS
OXYGEN SATURATION: 96 % | SYSTOLIC BLOOD PRESSURE: 151 MMHG | TEMPERATURE: 98.2 F | HEIGHT: 70 IN | WEIGHT: 250 LBS | HEART RATE: 70 BPM | BODY MASS INDEX: 35.79 KG/M2 | DIASTOLIC BLOOD PRESSURE: 87 MMHG

## 2022-11-15 PROCEDURE — 51798 US URINE CAPACITY MEASURE: CPT

## 2022-11-15 PROCEDURE — 99204 OFFICE O/P NEW MOD 45 MIN: CPT

## 2022-11-15 NOTE — PHYSICAL EXAM
[General Appearance - Well Developed] : well developed [General Appearance - Well Nourished] : well nourished [Abdomen Soft] : soft [Abdomen Tenderness] : non-tender [Scrotum] : the scrotum was normal [FreeTextEntry1] : 14F sandhu secure, urine tea-colored

## 2022-11-15 NOTE — ASSESSMENT
[FreeTextEntry1] : Mr. Ortiz presents for initial evaluation for urinary retention. He was recently seen in the [facility] ED and had a Matos placed to drain 900 cc urine. Denies a previous h/o BPH or urinary retention. Cr and Ucx reviewed, all WNL. Denies having had PSA screening.\par \par Successful void trial today. PVR: 0\par \par Recommendations\par -continue tamsulosin - script renewed\par -Cystoscopy\par -Uroflow\par -IPSS- at f/u\par -PSA - at f/u\par -JEANETTE - at f/u\par

## 2022-11-15 NOTE — HISTORY OF PRESENT ILLNESS
[FreeTextEntry1] : 62M presents for initial evaluation of urinary retention \par \par PMH significant for: DM, COPD, HTN, tobacco use  \par PSH significant for: Rotator cuff repair  \par Significant meds: prednisone, percocet, amlodipine, losartan, tamsulosin \par  \par Seen in [facility] ED on 11/12 for urinary retention \par Reports 2 day history of progressive difficulty urinating, abdominal fullness\par No previous h/o BPH or retention\par Matos placed, 900 cc drained \par WBC: 6.6, Cr: 0.7, UA: WNL, Ucx: negative \par \par Reports intermittent gross hematuria with Matos\par Denies incontinence, bladder spasms, fevers, chills, nausea, vomiting

## 2022-11-21 ENCOUNTER — APPOINTMENT (OUTPATIENT)
Dept: UROLOGY | Facility: CLINIC | Age: 62
End: 2022-11-21

## 2022-12-01 ENCOUNTER — APPOINTMENT (OUTPATIENT)
Dept: UROLOGY | Facility: CLINIC | Age: 62
End: 2022-12-01

## 2022-12-01 VITALS
DIASTOLIC BLOOD PRESSURE: 80 MMHG | RESPIRATION RATE: 17 BRPM | TEMPERATURE: 98 F | HEART RATE: 85 BPM | SYSTOLIC BLOOD PRESSURE: 130 MMHG

## 2022-12-01 DIAGNOSIS — N40.1 BENIGN PROSTATIC HYPERPLASIA WITH LOWER URINARY TRACT SYMPMS: ICD-10-CM

## 2022-12-01 DIAGNOSIS — N13.8 BENIGN PROSTATIC HYPERPLASIA WITH LOWER URINARY TRACT SYMPMS: ICD-10-CM

## 2022-12-01 PROCEDURE — 51741 ELECTRO-UROFLOWMETRY FIRST: CPT

## 2022-12-01 PROCEDURE — 51798 US URINE CAPACITY MEASURE: CPT

## 2022-12-01 PROCEDURE — 52000 CYSTOURETHROSCOPY: CPT

## 2022-12-02 LAB
PSA FREE FLD-MCNC: 18 %
PSA FREE SERPL-MCNC: 0.24 NG/ML
PSA SERPL-MCNC: 1.36 NG/ML

## 2023-01-03 NOTE — ASU DISCHARGE PLAN (ADULT/PEDIATRIC). - DISCHARGE PLAN IS COMPLETE AND GIVEN TO PATIENT
: Yes Spiral Flap Text: The defect edges were debeveled with a #15 scalpel blade.  Given the location of the defect, shape of the defect and the proximity to free margins a spiral flap was deemed most appropriate.  Using a sterile surgical marker, an appropriate rotation flap was drawn incorporating the defect and placing the expected incisions within the relaxed skin tension lines where possible. The area thus outlined was incised deep to adipose tissue with a #15 scalpel blade.  The skin margins were undermined to an appropriate distance in all directions utilizing iris scissors.

## 2023-01-10 NOTE — ED PROVIDER NOTE - CLINICAL SUMMARY MEDICAL DECISION MAKING FREE TEXT BOX
62-year-old male with past medical history of hypertension tobacco use disorder chronic back pain presenting with urinary retention for 2 days patient states he has been able to urinate only little bits feels like he needs to go. pt w/ bladder w/ > 300ml urine. no fever, chills, numbness, weakness. will obtain cbc, bmp, start flomax, place sandhu and urology f/u
declines

## 2023-01-27 NOTE — ED PROVIDER NOTE - MOUTH/THROAT [+], MLM
+mild throat pain/HOARSENESS pt with c/o sore throat since monday , c/o feeling fatigued with diff speaking. pt put on antibiotics yesterday.

## 2023-06-01 ENCOUNTER — APPOINTMENT (OUTPATIENT)
Dept: UROLOGY | Facility: CLINIC | Age: 63
End: 2023-06-01

## 2023-07-06 ENCOUNTER — OUTPATIENT (OUTPATIENT)
Dept: OUTPATIENT SERVICES | Facility: HOSPITAL | Age: 63
LOS: 1 days | Discharge: HOME | End: 2023-07-06
Payer: MEDICARE

## 2023-07-06 DIAGNOSIS — S60.511A ABRASION OF RIGHT HAND, INITIAL ENCOUNTER: Chronic | ICD-10-CM

## 2023-07-06 DIAGNOSIS — Z98.890 OTHER SPECIFIED POSTPROCEDURAL STATES: Chronic | ICD-10-CM

## 2023-07-06 DIAGNOSIS — R94.39 ABNORMAL RESULT OF OTHER CARDIOVASCULAR FUNCTION STUDY: ICD-10-CM

## 2023-07-06 LAB
ANION GAP SERPL CALC-SCNC: 13 MMOL/L — SIGNIFICANT CHANGE UP (ref 7–14)
BUN SERPL-MCNC: 16 MG/DL — SIGNIFICANT CHANGE UP (ref 7–23)
CALCIUM SERPL-MCNC: 9.4 MG/DL — SIGNIFICANT CHANGE UP (ref 8.4–10.5)
CHLORIDE SERPL-SCNC: 105 MMOL/L — SIGNIFICANT CHANGE UP (ref 98–107)
CO2 SERPL-SCNC: 22 MMOL/L — SIGNIFICANT CHANGE UP (ref 22–31)
CREAT SERPL-MCNC: 0.69 MG/DL — SIGNIFICANT CHANGE UP (ref 0.5–1.3)
EGFR: 105 ML/MIN/1.73M2 — SIGNIFICANT CHANGE UP
GLUCOSE SERPL-MCNC: 101 MG/DL — HIGH (ref 70–99)
HCT VFR BLD CALC: 47.8 % — SIGNIFICANT CHANGE UP (ref 39–50)
HGB BLD-MCNC: 15.8 G/DL — SIGNIFICANT CHANGE UP (ref 13–17)
MCHC RBC-ENTMCNC: 28.9 PG — SIGNIFICANT CHANGE UP (ref 27–34)
MCHC RBC-ENTMCNC: 33.1 GM/DL — SIGNIFICANT CHANGE UP (ref 32–36)
MCV RBC AUTO: 87.5 FL — SIGNIFICANT CHANGE UP (ref 80–100)
NRBC # BLD: 0 /100 WBCS — SIGNIFICANT CHANGE UP (ref 0–0)
NRBC # FLD: 0 K/UL — SIGNIFICANT CHANGE UP (ref 0–0)
PLATELET # BLD AUTO: 282 K/UL — SIGNIFICANT CHANGE UP (ref 150–400)
POTASSIUM SERPL-MCNC: 4.2 MMOL/L — SIGNIFICANT CHANGE UP (ref 3.5–5.3)
POTASSIUM SERPL-SCNC: 4.2 MMOL/L — SIGNIFICANT CHANGE UP (ref 3.5–5.3)
RBC # BLD: 5.46 M/UL — SIGNIFICANT CHANGE UP (ref 4.2–5.8)
RBC # FLD: 13.3 % — SIGNIFICANT CHANGE UP (ref 10.3–14.5)
SODIUM SERPL-SCNC: 140 MMOL/L — SIGNIFICANT CHANGE UP (ref 135–145)
WBC # BLD: 12.43 K/UL — HIGH (ref 3.8–10.5)
WBC # FLD AUTO: 12.43 K/UL — HIGH (ref 3.8–10.5)

## 2023-07-06 PROCEDURE — 93010 ELECTROCARDIOGRAM REPORT: CPT

## 2023-07-06 RX ORDER — ALBUTEROL 90 UG/1
2 AEROSOL, METERED ORAL
Qty: 0 | Refills: 0 | DISCHARGE

## 2023-07-06 RX ORDER — SODIUM CHLORIDE 9 MG/ML
250 INJECTION INTRAMUSCULAR; INTRAVENOUS; SUBCUTANEOUS ONCE
Refills: 0 | Status: COMPLETED | OUTPATIENT
Start: 2023-07-06 | End: 2023-07-06

## 2023-07-06 RX ORDER — IBUPROFEN 200 MG
1 TABLET ORAL
Qty: 0 | Refills: 0 | DISCHARGE

## 2023-07-06 RX ORDER — SODIUM CHLORIDE 9 MG/ML
500 INJECTION INTRAMUSCULAR; INTRAVENOUS; SUBCUTANEOUS
Refills: 0 | Status: DISCONTINUED | OUTPATIENT
Start: 2023-07-06 | End: 2023-07-20

## 2023-07-06 RX ORDER — AMLODIPINE BESYLATE 2.5 MG/1
1 TABLET ORAL
Qty: 0 | Refills: 0 | DISCHARGE

## 2023-07-06 RX ORDER — SODIUM CHLORIDE 9 MG/ML
3 INJECTION INTRAMUSCULAR; INTRAVENOUS; SUBCUTANEOUS EVERY 8 HOURS
Refills: 0 | Status: DISCONTINUED | OUTPATIENT
Start: 2023-07-06 | End: 2023-07-20

## 2023-07-06 RX ORDER — CYCLOBENZAPRINE HYDROCHLORIDE 10 MG/1
1 TABLET, FILM COATED ORAL
Qty: 0 | Refills: 0 | DISCHARGE

## 2023-07-06 RX ORDER — LOSARTAN POTASSIUM 100 MG/1
1 TABLET, FILM COATED ORAL
Qty: 0 | Refills: 0 | DISCHARGE

## 2023-07-06 RX ADMIN — SODIUM CHLORIDE 1000 MILLILITER(S): 9 INJECTION INTRAMUSCULAR; INTRAVENOUS; SUBCUTANEOUS at 09:27

## 2023-07-06 RX ADMIN — SODIUM CHLORIDE 100 MILLILITER(S): 9 INJECTION INTRAMUSCULAR; INTRAVENOUS; SUBCUTANEOUS at 11:36

## 2023-07-06 RX ADMIN — SODIUM CHLORIDE 75 MILLILITER(S): 9 INJECTION INTRAMUSCULAR; INTRAVENOUS; SUBCUTANEOUS at 09:45

## 2023-07-06 NOTE — H&P CARDIOLOGY - NS SC CAGE ALCOHOL ANNOYED YOU
Interval/Overnight Events:    ===========================RESPIRATORY==========================  RR: 24 (23 @ 08:00) (23 - 25)  SpO2: 95% (23 @ 08:00) (88% - 100%)  End Tidal CO2:    Respiratory Support: Mode: vdr4, RR (machine): 25, FiO2: 40, PEEP: 10, ITime: 1    albuterol  Intermittent Nebulization - Peds 2.5 milliGRAM(s) Nebulizer every 4 hours  dornase reyna for Nebulization - Peds 2.5 milliGRAM(s) Nebulizer every 12 hours  ipratropium 0.02% for Nebulization - Peds 250 MICROGram(s) Inhalation every 8 hours  [x] Airway Clearance Discussed  Extubation Readiness:  [ ] Not Applicable     [ ] Discussed and Assessed  Comments:    =========================CARDIOVASCULAR========================  HR: 157 (23 @ 08:00) (139 - 172)  BP: 69/28 (23 @ 22:00) (69/28 - 69/28)  ABP: 60/35 (23 @ 08:00) (55/33 - 109/66)  CVP(mm Hg): 9 (23 @ 22:30) ( - 12)    furosemide Infusion - Peds 0.05 mG/kG/Hr IV Continuous <Continuous>  Comments:    =====================HEMATOLOGY/ONCOLOGY=====================  Transfusions in the last 24 hours:	[ ] PRBC	[ ] Platelets	[ ] FFP	[ ] Cryoprecipitate    [ ] Other  DVT Prophylaxis:  heparin   Infusion - Pediatric 0.254 Unit(s)/kG/Hr IV Continuous <Continuous>  vancomycin 2 mG/mL - heparin  Lock 100 Units/mL - Peds 0.5 milliLiter(s) Catheter every 24 hours  vancomycin 2 mG/mL - heparin  Lock 100 Units/mL - Peds 0.5 milliLiter(s) Catheter every 24 hours  Comments:    ========================INFECTIOUS DISEASE=======================  T(C): 37.1 (23 @ 08:00), Max: 37.4 (23 @ 23:00)  T(F): 98.7 (23 @ 08:00), Max: 99.3 (23 @ 23:00)  [ ] Cooling [place] being used. Target Temperature:      ==================FLUIDS/ELECTROLYTES/NUTRITION=================  I&O's Summary    23 Dec 2023 07:  -  24 Dec 2023 07:00  --------------------------------------------------------  IN: 1008 mL / OUT: 1192 mL / NET: -184 mL    24 Dec 2023 07:01  -  24 Dec 2023 08:13  --------------------------------------------------------  IN: 81.4 mL / OUT: 0 mL / NET: 81.4 mL      Diet:   [ ] NPO        [ ]  PO           [ ] NGT		[ ] NDT		[ ] GT		[ ] GJT    dextrose 5% + sodium chloride 0.45% with potassium chloride 20 mEq/L. - Pediatric 1000 milliLiter(s) IV Continuous <Continuous>  famotidine IV Intermittent - Peds 3 milliGRAM(s) IV Intermittent every 12 hours  pantoprazole  IV Intermittent - Peds 3.5 milliGRAM(s) IV Intermittent every 12 hours  sodium bicarbonate 8.4% IV Intermittent - Peds 2 milliEquivalent(s) IV Intermittent once  sodium chloride 0.9% -  250 milliLiter(s) IV Continuous <Continuous>  sodium chloride 0.9%. - Pediatric 1000 milliLiter(s) IV Continuous <Continuous>  sodium chloride 0.9%. - Pediatric 1000 milliLiter(s) IV Continuous <Continuous>  Comments:    ==========================NEUROLOGY===========================  [ ] SBS:	 [ ] BILL-1:	[ ] BIS:	[ ] CAPD:  dexMEDEtomidine Infusion - Peds 2 MICROgram(s)/kG/Hr IV Continuous <Continuous>  levETIRAcetam IV Intermittent - Peds 60 milliGRAM(s) IV Intermittent every 12 hours  methadone IV Intermittent -  0.6 milliGRAM(s) IV Intermittent every 6 hours  midazolam Infusion - Peds 0.24 mG/kG/Hr IV Continuous <Continuous>  midazolam IV Intermittent - Peds 1.4 milliGRAM(s) IV Intermittent every 1 hour PRN  morphine  IV Intermittent - Peds 4.1 milliGRAM(s) IV Intermittent every 1 hour PRN  morphine Infusion - Peds 0.7 mG/kG/Hr IV Continuous <Continuous>  veCURonium  IV Push - Peds 0.89 milliGRAM(s) IV Push every 1 hour PRN  veCURonium Infusion - Peds 0.15 mG/kG/Hr IV Continuous <Continuous>  [x] Adequacy of sedation and pain control has been assessed and adjusted  Comments:    OTHER MEDICATIONS:  chlorhexidine 0.12% Oral Liquid - Peds 15 milliLiter(s) Swish and Spit two times a day  chlorhexidine 2% Topical Cloths - Peds 1 Application(s) Topical daily  petrolatum, white/mineral oil Ophthalmic Ointment - Peds 1 Application(s) Both EYES two times a day    =========================PATIENT CARE==========================  [ ] There are pressure ulcers/areas of breakdown that are being addressed.  [x] Preventative measures are being taken to decrease risk for skin breakdown.  [x] Necessity of urinary, arterial, and venous catheters discussed    =========================PHYSICAL EXAM=========================  GENERAL: no acute distress, well nourished  HEENT: NC/AT, PEERL  RESPIRATORY:   CARDIOVASCULAR: RRR  ABDOMEN: soft, NT/ND  SKIN: WWP, cap refill <2s. No rash  EXTREMITIES: No peripheral edema  NEUROLOGIC: no focal deficits    ===============================================================  LABS:  Oxygenation Index= 9.8   [Based on FiO2 = 40 (2023 23:19), PaO2 = 82 (2023 23:36), MAP = 20 (2023 23:19)]  Oxygen Saturation Index= 8.4   [Based on FiO2 = 40 (2023 07:19), SpO2 = 95 (2023 08:00), MAP = 20 (2023 03:21)]  ABG - ( 23 Dec 2023 23:36 )  pH: 7.33  /  pCO2: 45    /  pO2: 82    / HCO3: 24    / Base Excess: -2.3  /  SaO2: 96.0  / Lactate: x                                                9.0                   Neurophils% (auto):   43.6   ( @ 02:00):    8.76 )-----------(80           Lymphocytes% (auto):  36.4                                          26.6                   Eosinphils% (auto):   8.0      Manual%: Neutrophils x    ; Lymphocytes x    ; Eosinophils x    ; Bands%: x    ; Blasts x            138  |  102  |  6<L>  ----------------------------<  101<H>  4.0   |  23  |  0.23    Ca    9.2      24 Dec 2023 02:00  Phos  9.8       Mg     1.80         TPro  5.7<L>  /  Alb  3.4  /  TBili  0.6  /  DBili  x   /  AST  27  /  ALT  24  /  AlkPhos  114    RECENT CULTURES:   @ 22:58 Rectal     Culture NEGATIVE for ESBL-producing organism.  ************************************************************  This surveillance culture is intended for Infection Control  purposes only. It detects Extended-spectrum beta lactamase (ESBL)  producing strains of  E. coli, K. pneumoniae and K. oxytoca. A negative result  does not preclude the carriage of ESBL-producing organisms.       @ 22:46 Rectal     No vancomycin resistant Enterococcus isolated to date       @ 15:53 Rectal Rectal     Culture NEGATIVE for Carbapenem Resistant Organisms  This surveillance culture is intended for Infection Control purposes only.  It detects Carbapenem resistant strains of K. pneumoniae and E. coli.  A negative result does not preclude the carriageof other  carbapenemase-producing organisms.       @ 05:00 .Blood Blood     No growth at 24 hours       @ 05:00 .Blood Blood     No growth at 48 Hours      12 @ 05:00 .Blood Blood     No growth at 72 Hours      12- @ 08:29 .Blood Blood     No growth at 4 days            IMAGING STUDIES:    Parent/Guardian is at the bedside:	[ x] Yes	[ ] No  Patient and Parent/Guardian updated as to the progress/plan of care:	[x ] Yes	[ ] No    [ ] The patient remains in critical and unstable condition, and requires ICU care and monitoring, total critical care time spent by myself, the attending physician was __ minutes, excluding procedure time.  [ ] The patient is improving but requires continued monitoring and adjustment of therapy Interval/Overnight Events:    ===========================RESPIRATORY==========================  RR: 24 (23 @ 08:00) (23 - 25)  SpO2: 95% (23 @ 08:00) (88% - 100%)  End Tidal CO2:    Respiratory Support: Mode: vdr4, RR (machine): 25, FiO2: 40, PEEP: 10, ITime: 1    albuterol  Intermittent Nebulization - Peds 2.5 milliGRAM(s) Nebulizer every 4 hours  dornase reyna for Nebulization - Peds 2.5 milliGRAM(s) Nebulizer every 12 hours  ipratropium 0.02% for Nebulization - Peds 250 MICROGram(s) Inhalation every 8 hours  [x] Airway Clearance Discussed  Extubation Readiness:  [ ] Not Applicable     [ ] Discussed and Assessed  Comments:    =========================CARDIOVASCULAR========================  HR: 157 (23 @ 08:00) (139 - 172)  BP: 69/28 (23 @ 22:00) (69/28 - 69/28)  ABP: 60/35 (23 @ 08:00) (55/33 - 109/66)  CVP(mm Hg): 9 (23 @ 22:30) ( - 12)    furosemide Infusion - Peds 0.05 mG/kG/Hr IV Continuous <Continuous>  Comments:    =====================HEMATOLOGY/ONCOLOGY=====================  Transfusions in the last 24 hours:	[ ] PRBC	[ ] Platelets	[ ] FFP	[ ] Cryoprecipitate    [ ] Other  DVT Prophylaxis:  heparin   Infusion - Pediatric 0.254 Unit(s)/kG/Hr IV Continuous <Continuous>  vancomycin 2 mG/mL - heparin  Lock 100 Units/mL - Peds 0.5 milliLiter(s) Catheter every 24 hours  vancomycin 2 mG/mL - heparin  Lock 100 Units/mL - Peds 0.5 milliLiter(s) Catheter every 24 hours  Comments:    ========================INFECTIOUS DISEASE=======================  T(C): 37.1 (23 @ 08:00), Max: 37.4 (23 @ 23:00)  T(F): 98.7 (23 @ 08:00), Max: 99.3 (23 @ 23:00)  [ ] Cooling [place] being used. Target Temperature:      ==================FLUIDS/ELECTROLYTES/NUTRITION=================  I&O's Summary    23 Dec 2023 07:  -  24 Dec 2023 07:00  --------------------------------------------------------  IN: 1008 mL / OUT: 1192 mL / NET: -184 mL    24 Dec 2023 07:01  -  24 Dec 2023 08:13  --------------------------------------------------------  IN: 81.4 mL / OUT: 0 mL / NET: 81.4 mL      Diet:   [ ] NPO        [ ]  PO           [ ] NGT		[ ] NDT		[ ] GT		[ ] GJT    dextrose 5% + sodium chloride 0.45% with potassium chloride 20 mEq/L. - Pediatric 1000 milliLiter(s) IV Continuous <Continuous>  famotidine IV Intermittent - Peds 3 milliGRAM(s) IV Intermittent every 12 hours  pantoprazole  IV Intermittent - Peds 3.5 milliGRAM(s) IV Intermittent every 12 hours  sodium bicarbonate 8.4% IV Intermittent - Peds 2 milliEquivalent(s) IV Intermittent once  sodium chloride 0.9% -  250 milliLiter(s) IV Continuous <Continuous>  sodium chloride 0.9%. - Pediatric 1000 milliLiter(s) IV Continuous <Continuous>  sodium chloride 0.9%. - Pediatric 1000 milliLiter(s) IV Continuous <Continuous>  Comments:    ==========================NEUROLOGY===========================  [ ] SBS:	 [ ] BILL-1:	[ ] BIS:	[ ] CAPD:  dexMEDEtomidine Infusion - Peds 2 MICROgram(s)/kG/Hr IV Continuous <Continuous>  levETIRAcetam IV Intermittent - Peds 60 milliGRAM(s) IV Intermittent every 12 hours  methadone IV Intermittent -  0.6 milliGRAM(s) IV Intermittent every 6 hours  midazolam Infusion - Peds 0.24 mG/kG/Hr IV Continuous <Continuous>  midazolam IV Intermittent - Peds 1.4 milliGRAM(s) IV Intermittent every 1 hour PRN  morphine  IV Intermittent - Peds 4.1 milliGRAM(s) IV Intermittent every 1 hour PRN  morphine Infusion - Peds 0.7 mG/kG/Hr IV Continuous <Continuous>  veCURonium  IV Push - Peds 0.89 milliGRAM(s) IV Push every 1 hour PRN  veCURonium Infusion - Peds 0.15 mG/kG/Hr IV Continuous <Continuous>  [x] Adequacy of sedation and pain control has been assessed and adjusted  Comments:    OTHER MEDICATIONS:  chlorhexidine 0.12% Oral Liquid - Peds 15 milliLiter(s) Swish and Spit two times a day  chlorhexidine 2% Topical Cloths - Peds 1 Application(s) Topical daily  petrolatum, white/mineral oil Ophthalmic Ointment - Peds 1 Application(s) Both EYES two times a day    =========================PATIENT CARE==========================  [ ] There are pressure ulcers/areas of breakdown that are being addressed.  [x] Preventative measures are being taken to decrease risk for skin breakdown.  [x] Necessity of urinary, arterial, and venous catheters discussed    =========================PHYSICAL EXAM=========================  GENERAL: no acute distress, well nourished  HEENT: NC/AT, PEERL  RESPIRATORY:   CARDIOVASCULAR: RRR  ABDOMEN: soft, NT/ND  SKIN: WWP, cap refill <2s. No rash  EXTREMITIES: No peripheral edema  NEUROLOGIC: no focal deficits    ===============================================================  LABS:  Oxygenation Index= 9.8   [Based on FiO2 = 40 (2023 23:19), PaO2 = 82 (2023 23:36), MAP = 20 (2023 23:19)]  Oxygen Saturation Index= 8.4   [Based on FiO2 = 40 (2023 07:19), SpO2 = 95 (2023 08:00), MAP = 20 (2023 03:21)]  ABG - ( 23 Dec 2023 23:36 )  pH: 7.33  /  pCO2: 45    /  pO2: 82    / HCO3: 24    / Base Excess: -2.3  /  SaO2: 96.0  / Lactate: x                                                9.0                   Neurophils% (auto):   43.6   ( @ 02:00):    8.76 )-----------(80           Lymphocytes% (auto):  36.4                                          26.6                   Eosinphils% (auto):   8.0      Manual%: Neutrophils x    ; Lymphocytes x    ; Eosinophils x    ; Bands%: x    ; Blasts x            138  |  102  |  6<L>  ----------------------------<  101<H>  4.0   |  23  |  0.23    Ca    9.2      24 Dec 2023 02:00  Phos  9.8       Mg     1.80         TPro  5.7<L>  /  Alb  3.4  /  TBili  0.6  /  DBili  x   /  AST  27  /  ALT  24  /  AlkPhos  114    RECENT CULTURES:   @ 22:58 Rectal     Culture NEGATIVE for ESBL-producing organism.  ************************************************************  This surveillance culture is intended for Infection Control  purposes only. It detects Extended-spectrum beta lactamase (ESBL)  producing strains of  E. coli, K. pneumoniae and K. oxytoca. A negative result  does not preclude the carriage of ESBL-producing organisms.       @ 22:46 Rectal     No vancomycin resistant Enterococcus isolated to date       @ 15:53 Rectal Rectal     Culture NEGATIVE for Carbapenem Resistant Organisms  This surveillance culture is intended for Infection Control purposes only.  It detects Carbapenem resistant strains of K. pneumoniae and E. coli.  A negative result does not preclude the carriageof other  carbapenemase-producing organisms.       @ 05:00 .Blood Blood     No growth at 24 hours       @ 05:00 .Blood Blood     No growth at 48 Hours      12 @ 05:00 .Blood Blood     No growth at 72 Hours      12- @ 08:29 .Blood Blood     No growth at 4 days            IMAGING STUDIES:    Parent/Guardian is at the bedside:	[ x] Yes	[ ] No  Patient and Parent/Guardian updated as to the progress/plan of care:	[x ] Yes	[ ] No    [ ] The patient remains in critical and unstable condition, and requires ICU care and monitoring, total critical care time spent by myself, the attending physician was __ minutes, excluding procedure time.  [ ] The patient is improving but requires continued monitoring and adjustment of therapy no Interval/Overnight Events:    ===========================RESPIRATORY==========================  RR: 24 (23 @ 08:00) (23 - 25)  SpO2: 95% (23 @ 08:00) (88% - 100%)  End Tidal CO2:    Respiratory Support: Mode: vdr4, RR (machine): 25, 30/10     albuterol  Intermittent Nebulization - Peds 2.5 milliGRAM(s) Nebulizer every 4 hours  dornase reyna for Nebulization - Peds 2.5 milliGRAM(s) Nebulizer every 12 hours  ipratropium 0.02% for Nebulization - Peds 250 MICROGram(s) Inhalation every 8 hours  [x] Airway Clearance Discussed  Extubation Readiness:  [ ] Not Applicable     [x ] Discussed and Assessed  Comments:    =========================CARDIOVASCULAR========================  HR: 157 (23 @ 08:00) (139 - 172)  BP: 69/28 (23 @ 22:00) (69/28 - 69/28)  ABP: 60/35 (23 @ 08:00) (55/33 - 109/66)  CVP(mm Hg): 9 (23 @ 22:30) (9 - 12)    furosemide Infusion - Peds 0.05 mG/kG/Hr IV Continuous <Continuous>  Comments:    =====================HEMATOLOGY/ONCOLOGY=====================  Transfusions in the last 24 hours:	[ ] PRBC	[ ] Platelets	[ ] FFP	[ ] Cryoprecipitate    [ ] Other  DVT Prophylaxis:  heparin   Infusion - Pediatric 0.254 Unit(s)/kG/Hr IV Continuous <Continuous>  vancomycin 2 mG/mL - heparin  Lock 100 Units/mL - Peds 0.5 milliLiter(s) Catheter every 24 hours  vancomycin 2 mG/mL - heparin  Lock 100 Units/mL - Peds 0.5 milliLiter(s) Catheter every 24 hours  Comments:    ========================INFECTIOUS DISEASE=======================  T(C): 37.1 (23 @ 08:00), Max: 37.4 (23 @ 23:00)  T(F): 98.7 (23 @ 08:00), Max: 99.3 (23 @ 23:00)  [ ] Cooling [place] being used. Target Temperature:      ==================FLUIDS/ELECTROLYTES/NUTRITION=================  I&O's Summary    23 Dec 2023 07:  -  24 Dec 2023 07:00  --------------------------------------------------------  IN: 1008 mL / OUT: 1192 mL / NET: -184 mL    24 Dec 2023 07:  -  24 Dec 2023 08:13  --------------------------------------------------------  IN: 81.4 mL / OUT: 0 mL / NET: 81.4 mL      Diet:   [ ] NPO        [ ]  PO           [ ] NGT		[ ] NDT		[ ] GT		[x ] GJT    dextrose 5% + sodium chloride 0.45% with potassium chloride 20 mEq/L. - Pediatric 1000 milliLiter(s) IV Continuous <Continuous>  famotidine IV Intermittent - Peds 3 milliGRAM(s) IV Intermittent every 12 hours  pantoprazole  IV Intermittent - Peds 3.5 milliGRAM(s) IV Intermittent every 12 hours  sodium bicarbonate 8.4% IV Intermittent - Peds 2 milliEquivalent(s) IV Intermittent once  sodium chloride 0.9% -  250 milliLiter(s) IV Continuous <Continuous>  sodium chloride 0.9%. - Pediatric 1000 milliLiter(s) IV Continuous <Continuous>  sodium chloride 0.9%. - Pediatric 1000 milliLiter(s) IV Continuous <Continuous>  Comments:    ==========================NEUROLOGY===========================  [ ] SBS:	 [ ] BILL-1:	[ ] BIS:	[ ] CAPD:  dexMEDEtomidine Infusion - Peds 2 MICROgram(s)/kG/Hr IV Continuous <Continuous>  levETIRAcetam IV Intermittent - Peds 60 milliGRAM(s) IV Intermittent every 12 hours  methadone IV Intermittent -  0.6 milliGRAM(s) IV Intermittent every 6 hours  midazolam Infusion - Peds 0.24 mG/kG/Hr IV Continuous <Continuous>  midazolam IV Intermittent - Peds 1.4 milliGRAM(s) IV Intermittent every 1 hour PRN  morphine  IV Intermittent - Peds 4.1 milliGRAM(s) IV Intermittent every 1 hour PRN  morphine Infusion - Peds 0.7 mG/kG/Hr IV Continuous <Continuous>  veCURonium  IV Push - Peds 0.89 milliGRAM(s) IV Push every 1 hour PRN  veCURonium Infusion - Peds 0.15 mG/kG/Hr IV Continuous <Continuous>  [x] Adequacy of sedation and pain control has been assessed and adjusted  Comments:    OTHER MEDICATIONS:  chlorhexidine 0.12% Oral Liquid - Peds 15 milliLiter(s) Swish and Spit two times a day  chlorhexidine 2% Topical Cloths - Peds 1 Application(s) Topical daily  petrolatum, white/mineral oil Ophthalmic Ointment - Peds 1 Application(s) Both EYES two times a day    =========================PATIENT CARE==========================  [ ] There are pressure ulcers/areas of breakdown that are being addressed.  [x] Preventative measures are being taken to decrease risk for skin breakdown.  [x] Necessity of urinary, arterial, and venous catheters discussed    =========================PHYSICAL EXAM=========================  GENERAL: intubated, sedated, and paralyzed  HEENT: AFOF  RESPIRATORY: transmitted sounds from VDR   CARDIOVASCULAR: RRR  ABDOMEN: soft, NT/ND, GJ site c/d/i  SKIN: WWP, cap refill <2s. No rash  EXTREMITIES: minimal peripheral edema  NEUROLOGIC: sedated and paralyzed    ===============================================================  LABS:  Oxygenation Index= 9.8   [Based on FiO2 = 40 (2023 23:19), PaO2 = 82 (2023 23:36), MAP = 20 (2023 23:19)]  Oxygen Saturation Index= 8.4   [Based on FiO2 = 40 (2023 07:19), SpO2 = 95 (2023 08:00), MAP = 20 (2023 03:21)]  ABG - ( 23 Dec 2023 23:36 )  pH: 7.33  /  pCO2: 45    /  pO2: 82    / HCO3: 24    / Base Excess: -2.3  /  SaO2: 96.0  / Lactate: x                                                9.0                   Neurophils% (auto):   43.6   ( @ 02:00):    8.76 )-----------(80           Lymphocytes% (auto):  36.4                                          26.6                   Eosinphils% (auto):   8.0      Manual%: Neutrophils x    ; Lymphocytes x    ; Eosinophils x    ; Bands%: x    ; Blasts x            138  |  102  |  6<L>  ----------------------------<  101<H>  4.0   |  23  |  0.23    Ca    9.2      24 Dec 2023 02:00  Phos  9.8       Mg     1.80         TPro  5.7<L>  /  Alb  3.4  /  TBili  0.6  /  DBili  x   /  AST  27  /  ALT  24  /  AlkPhos  114    RECENT CULTURES:   @ 22:58 Rectal     Culture NEGATIVE for ESBL-producing organism.  ************************************************************  This surveillance culture is intended for Infection Control  purposes only. It detects Extended-spectrum beta lactamase (ESBL)  producing strains of  E. coli, K. pneumoniae and K. oxytoca. A negative result  does not preclude the carriage of ESBL-producing organisms.       @ 22:46 Rectal     No vancomycin resistant Enterococcus isolated to date       @ 15:53 Rectal Rectal     Culture NEGATIVE for Carbapenem Resistant Organisms  This surveillance culture is intended for Infection Control purposes only.  It detects Carbapenem resistant strains of K. pneumoniae and E. coli.  A negative result does not preclude the carriageof other  carbapenemase-producing organisms.       @ 05:00 .Blood Blood     No growth at 24 hours      12- @ 05:00 .Blood Blood     No growth at 48 Hours      12 @ 05:00 .Blood Blood     No growth at 72 Hours      12- @ 08:29 .Blood Blood     No growth at 4 days            IMAGING STUDIES:    Parent/Guardian is at the bedside:	[ x] Yes	[ ] No  Patient and Parent/Guardian updated as to the progress/plan of care:	[x ] Yes	[ ] No    [x ] The patient remains in critical and unstable condition, and requires ICU care and monitoring, total critical care time spent by myself, the attending physician was 45 minutes, excluding procedure time.  [ ] The patient is improving but requires continued monitoring and adjustment of therapy Interval/Overnight Events:    ===========================RESPIRATORY==========================  RR: 24 (23 @ 08:00) (23 - 25)  SpO2: 95% (23 @ 08:00) (88% - 100%)  End Tidal CO2:    Respiratory Support: Mode: vdr4, RR (machine): 25, 30/10     albuterol  Intermittent Nebulization - Peds 2.5 milliGRAM(s) Nebulizer every 4 hours  dornase reyna for Nebulization - Peds 2.5 milliGRAM(s) Nebulizer every 12 hours  ipratropium 0.02% for Nebulization - Peds 250 MICROGram(s) Inhalation every 8 hours  [x] Airway Clearance Discussed  Extubation Readiness:  [ ] Not Applicable     [x ] Discussed and Assessed  Comments:    =========================CARDIOVASCULAR========================  HR: 157 (23 @ 08:00) (139 - 172)  BP: 69/28 (23 @ 22:00) (69/28 - 69/28)  ABP: 60/35 (23 @ 08:00) (55/33 - 109/66)  CVP(mm Hg): 9 (23 @ 22:30) (9 - 12)    furosemide Infusion - Peds 0.05 mG/kG/Hr IV Continuous <Continuous>  Comments:    =====================HEMATOLOGY/ONCOLOGY=====================  Transfusions in the last 24 hours:	[ ] PRBC	[ ] Platelets	[ ] FFP	[ ] Cryoprecipitate    [ ] Other  DVT Prophylaxis:  heparin   Infusion - Pediatric 0.254 Unit(s)/kG/Hr IV Continuous <Continuous>  vancomycin 2 mG/mL - heparin  Lock 100 Units/mL - Peds 0.5 milliLiter(s) Catheter every 24 hours  vancomycin 2 mG/mL - heparin  Lock 100 Units/mL - Peds 0.5 milliLiter(s) Catheter every 24 hours  Comments:    ========================INFECTIOUS DISEASE=======================  T(C): 37.1 (23 @ 08:00), Max: 37.4 (23 @ 23:00)  T(F): 98.7 (23 @ 08:00), Max: 99.3 (23 @ 23:00)  [ ] Cooling [place] being used. Target Temperature:      ==================FLUIDS/ELECTROLYTES/NUTRITION=================  I&O's Summary    23 Dec 2023 07:  -  24 Dec 2023 07:00  --------------------------------------------------------  IN: 1008 mL / OUT: 1192 mL / NET: -184 mL    24 Dec 2023 07:  -  24 Dec 2023 08:13  --------------------------------------------------------  IN: 81.4 mL / OUT: 0 mL / NET: 81.4 mL      Diet:   [ ] NPO        [ ]  PO           [ ] NGT		[ ] NDT		[ ] GT		[x ] GJT    dextrose 5% + sodium chloride 0.45% with potassium chloride 20 mEq/L. - Pediatric 1000 milliLiter(s) IV Continuous <Continuous>  famotidine IV Intermittent - Peds 3 milliGRAM(s) IV Intermittent every 12 hours  pantoprazole  IV Intermittent - Peds 3.5 milliGRAM(s) IV Intermittent every 12 hours  sodium bicarbonate 8.4% IV Intermittent - Peds 2 milliEquivalent(s) IV Intermittent once  sodium chloride 0.9% -  250 milliLiter(s) IV Continuous <Continuous>  sodium chloride 0.9%. - Pediatric 1000 milliLiter(s) IV Continuous <Continuous>  sodium chloride 0.9%. - Pediatric 1000 milliLiter(s) IV Continuous <Continuous>  Comments:    ==========================NEUROLOGY===========================  [ ] SBS:	 [ ] BILL-1:	[ ] BIS:	[ ] CAPD:  dexMEDEtomidine Infusion - Peds 2 MICROgram(s)/kG/Hr IV Continuous <Continuous>  levETIRAcetam IV Intermittent - Peds 60 milliGRAM(s) IV Intermittent every 12 hours  methadone IV Intermittent -  0.6 milliGRAM(s) IV Intermittent every 6 hours  midazolam Infusion - Peds 0.24 mG/kG/Hr IV Continuous <Continuous>  midazolam IV Intermittent - Peds 1.4 milliGRAM(s) IV Intermittent every 1 hour PRN  morphine  IV Intermittent - Peds 4.1 milliGRAM(s) IV Intermittent every 1 hour PRN  morphine Infusion - Peds 0.7 mG/kG/Hr IV Continuous <Continuous>  veCURonium  IV Push - Peds 0.89 milliGRAM(s) IV Push every 1 hour PRN  veCURonium Infusion - Peds 0.15 mG/kG/Hr IV Continuous <Continuous>  [x] Adequacy of sedation and pain control has been assessed and adjusted  Comments:    OTHER MEDICATIONS:  chlorhexidine 0.12% Oral Liquid - Peds 15 milliLiter(s) Swish and Spit two times a day  chlorhexidine 2% Topical Cloths - Peds 1 Application(s) Topical daily  petrolatum, white/mineral oil Ophthalmic Ointment - Peds 1 Application(s) Both EYES two times a day    =========================PATIENT CARE==========================  [ ] There are pressure ulcers/areas of breakdown that are being addressed.  [x] Preventative measures are being taken to decrease risk for skin breakdown.  [x] Necessity of urinary, arterial, and venous catheters discussed    =========================PHYSICAL EXAM=========================  GENERAL: intubated, sedated, and paralyzed  HEENT: AFOF  RESPIRATORY: transmitted sounds from VDR   CARDIOVASCULAR: RRR  ABDOMEN: soft, NT/ND, GJ site c/d/i  SKIN: WWP, cap refill <2s. No rash  EXTREMITIES: minimal peripheral edema  NEUROLOGIC: sedated and paralyzed    ===============================================================  LABS:  Oxygenation Index= 9.8   [Based on FiO2 = 40 (2023 23:19), PaO2 = 82 (2023 23:36), MAP = 20 (2023 23:19)]  Oxygen Saturation Index= 8.4   [Based on FiO2 = 40 (2023 07:19), SpO2 = 95 (2023 08:00), MAP = 20 (2023 03:21)]  ABG - ( 23 Dec 2023 23:36 )  pH: 7.33  /  pCO2: 45    /  pO2: 82    / HCO3: 24    / Base Excess: -2.3  /  SaO2: 96.0  / Lactate: x                                                9.0                   Neurophils% (auto):   43.6   ( @ 02:00):    8.76 )-----------(80           Lymphocytes% (auto):  36.4                                          26.6                   Eosinphils% (auto):   8.0      Manual%: Neutrophils x    ; Lymphocytes x    ; Eosinophils x    ; Bands%: x    ; Blasts x            138  |  102  |  6<L>  ----------------------------<  101<H>  4.0   |  23  |  0.23    Ca    9.2      24 Dec 2023 02:00  Phos  9.8       Mg     1.80         TPro  5.7<L>  /  Alb  3.4  /  TBili  0.6  /  DBili  x   /  AST  27  /  ALT  24  /  AlkPhos  114    RECENT CULTURES:   @ 22:58 Rectal     Culture NEGATIVE for ESBL-producing organism.  ************************************************************  This surveillance culture is intended for Infection Control  purposes only. It detects Extended-spectrum beta lactamase (ESBL)  producing strains of  E. coli, K. pneumoniae and K. oxytoca. A negative result  does not preclude the carriage of ESBL-producing organisms.       @ 22:46 Rectal     No vancomycin resistant Enterococcus isolated to date       @ 15:53 Rectal Rectal     Culture NEGATIVE for Carbapenem Resistant Organisms  This surveillance culture is intended for Infection Control purposes only.  It detects Carbapenem resistant strains of K. pneumoniae and E. coli.  A negative result does not preclude the carriageof other  carbapenemase-producing organisms.       @ 05:00 .Blood Blood     No growth at 24 hours      12- @ 05:00 .Blood Blood     No growth at 48 Hours      12 @ 05:00 .Blood Blood     No growth at 72 Hours      12- @ 08:29 .Blood Blood     No growth at 4 days            IMAGING STUDIES:    Parent/Guardian is at the bedside:	[ x] Yes	[ ] No  Patient and Parent/Guardian updated as to the progress/plan of care:	[x ] Yes	[ ] No    [x ] The patient remains in critical and unstable condition, and requires ICU care and monitoring, total critical care time spent by myself, the attending physician was 45 minutes, excluding procedure time.  [ ] The patient is improving but requires continued monitoring and adjustment of therapy

## 2023-07-06 NOTE — H&P CARDIOLOGY - HISTORY OF PRESENT ILLNESS
62 y.o. male presents today for elective cardiac catheterization. The patient c/o SOB with exertion. The patient denies chest pain,  palpitations, dizziness, presyncope, syncope,  headache, visual disturbances, CVA, PE, DVT, NICOLE, abdominal pain, N/V/D/C, hematochezia, melena, dysuria, hematuria, fever, chills. The patient was evaluated by a cardiologist, was found t have abnormal stress test, recommended to have cardiac cath.

## 2023-07-06 NOTE — H&P CARDIOLOGY - EKG AND INTERPRETATION
----- Message from Dom Ortiz MD sent at 5/26/2017  7:39 AM CDT -----  Please call patient and relay results as follows: Potassium is back to normal. Kidney function is stable.  
Patient informed of results per below. No further questions/concerns at this time.     
see chart

## 2023-07-06 NOTE — H&P CARDIOLOGY - NSICDXFAMILYHX_GEN_ALL_CORE_FT
FAMILY HISTORY:  Father  Still living? No  FH: dementia, Age at diagnosis: Age Unknown    Mother  Still living? No  Family history of bone cancer, Age at diagnosis: Age Unknown

## 2023-07-06 NOTE — H&P CARDIOLOGY - NSICDXPASTMEDICALHX_GEN_ALL_CORE_FT
PAST MEDICAL HISTORY:  Anxiety     Arthritis     Asthma Pt reports he has never been hospitalized for asthma, he used inhaler 1 yr ago    Bunion     Chronic radicular low back pain     Hammer toe     Hip pain, chronic, right     HLD (hyperlipidemia)     HTN (hypertension)     Localized swelling, mass and lump, left upper limb     Lumbar back pain     Obesity     Palmar fascial fibromatosis [dupuytren]     Rotator cuff disorder, left s/p surgery 2017    Trigger finger, left little finger

## 2023-07-13 PROBLEM — E78.5 HYPERLIPIDEMIA, UNSPECIFIED: Chronic | Status: ACTIVE | Noted: 2023-07-06

## 2023-07-13 PROBLEM — F41.9 ANXIETY DISORDER, UNSPECIFIED: Chronic | Status: ACTIVE | Noted: 2023-07-06

## 2023-07-13 PROBLEM — M54.16 RADICULOPATHY, LUMBAR REGION: Chronic | Status: ACTIVE | Noted: 2023-07-06

## 2023-07-18 ENCOUNTER — APPOINTMENT (OUTPATIENT)
Dept: ORTHOPEDIC SURGERY | Facility: CLINIC | Age: 63
End: 2023-07-18
Payer: MEDICARE

## 2023-07-18 DIAGNOSIS — M19.012 PRIMARY OSTEOARTHRITIS, LEFT SHOULDER: ICD-10-CM

## 2023-07-18 PROCEDURE — 73030 X-RAY EXAM OF SHOULDER: CPT | Mod: LT

## 2023-07-18 PROCEDURE — 99213 OFFICE O/P EST LOW 20 MIN: CPT

## 2023-07-28 PROBLEM — M19.012 PRIMARY OSTEOARTHRITIS OF LEFT SHOULDER: Status: ACTIVE | Noted: 2021-01-29

## 2023-07-28 NOTE — PHYSICAL EXAM
[de-identified] : Oriented to time, place, person\par Mood: Normal\par Affect: Normal\par Appearance: Healthy, well appearing, no acute distress.\par Gait: Normal\par Assistive Devices: None\par \par Left shoulder exam:\par \par Inspection: No malalignment, No defects, No atrophy\par Skin: No masses, No lesions\par Neck: Negative Spurling, full ROM, no pain with ROM\par AROM: FF to 180, abduction to 90, ER to 60, IR to upper lumbar\par Painful arc ROM: none\par Tenderness: No bicipital tenderness, + tenderness to greater tuberosity/RTC insertion, no anterior shoulder/lesser tuberosity tenderness\par Strength: 5/5 ER, 5/5 IR in adduction, 5/5 supraspinatus testing, negative [place]'s test\par AC joint: No TTP/pain with cross arm testing\par Biceps: Speed Negative, Yergason Negative \par Impingement test: Negative Moon, + Neer\par Vasc: 2+ radial pulse \par Stability: Stable \par Neuro: AIN, PIN, Ulnar nerve intact to motor\par Sensation: Intact to light touch throughout  [de-identified] : The following radiographs were ordered and read by me during this patients visit. I reviewed each radiograph in detail with the patient and discussed the findings as highlighted below.\par \par 3 views of left shoulder were obtained today, 07/18/2023, that show no acute fracture or dislocation. There is moderate glenohumeral and mild AC joint degenerative change seen. Type II acromion. There is no significant malalignment. There is calcification at the RTC insertion.

## 2023-07-28 NOTE — HISTORY OF PRESENT ILLNESS
[de-identified] : 62 year-old male s/p left shoulder arthroscopic biceps tenotomy, RTCR (SS, SSc), SAD, ACJR 2017, presents today with persistent worsening left shoulder pain. He has been receiving steroid injections in the shoulder from his pain management physician. Last injection was 6 months ago and provided only few weeks of relief. Pain is constant worse with overhead movements and lifting a coffee cup. Denies numbness or tingling.  Presents today for further evaluation and treatment as needed.

## 2023-07-28 NOTE — DISCUSSION/SUMMARY
[de-identified] : 63 y/o male with left shoulder RTC arthropathy. \par \par A discussion was had with the patient regarding degenerative joint disease that results from rotator cuff injury and loss of joint congruence and glenohumeral wear.  This is consistent with rotator cuff arthropathy which is characterized by the combination of rotator cuff insufficiency as well as glenohumeral cartilage destruction and superior migration of the humeral head.  Discussed that progression is common due to the loss of the compressive effects of the rotator cuff as well as progression of functional decline.  Rotator cuff arthropathy tends to limit range of motion as well as pseudoparalysis.\par \par Nonoperative management with activity modification, injection therapy, and physical therapy are the first-line treatments.  Physical therapy is focused on scapular and rotator cuff strengthening in an attempt to maximize function and decrease anterior superior escape.  Surgical intervention can include surgical arthroscopy with unpredictable results, or reverse total shoulder arthroplasty for patients that have pseudoparalysis/anterior superior escape with a functioning axillary nerve.\par \par Recommendation: Begin trial of PT, Rx given. Conservative care & observation, this includes rest/activity avoidance until less symptomatic with subsequent gradual return to full activity as tolerated. Patient may also use OTC NSAID's or acetaminophen as tolerated, with application of ice to the area 2-3x daily for 20 minutes after periods of activity. \par \par Follow-up as needed.

## 2023-07-28 NOTE — ADDENDUM
[FreeTextEntry1] : This note was written by Roxie Palmer on 07/18/2023 acting solely as a scribe for Dr. Kory Aguayo.\par \par All medical record entries made by the Scribe were at my, Dr. Kory Aguayo, direction and personally dictated by me on 07/18/2023. I have personally reviewed the chart and agree that the record accurately reflects my personal performance of the history, physical exam, assessment and plan.

## 2023-08-02 NOTE — ED PROVIDER NOTE - NSCAREINITIATED _GEN_ER
Inpatient Palliative Care     Location: [facility]     HPI:     Ashley English is a 62 y.o. female who presented to the ED on 7/12/2023 with complaints of right leg pain x3 days, previously seen at urgent care for same complaint and was noted to have DVT on RLQ ultrasound.  CTA of the chest revealed acute saddle pulmonary embolus as well as pneumoperitoneum.  She is status post exploratory laparotomy with left hemicolectomy and end colostomy as well as cholecystectomy on 7/13/2023.  Intraoperatively, a perforated colon at splenic flexure with associated mass was identified, concerning for malignancy; however, pathology was negative.  Patient returned to ICU intubated postoperatively and was able to be extubated and transferred to the floor. Unfortunately,  7/19/2023 patient was found to be in hemorrhagic shock from intra-abdominal bleed and liver laceration, patient was reintubated and returned to ICU.  7/20/2023 patient returned to the OR for revision of colostomy and colon resection.  Started on CRRT 7/21/2023.  Patient now tolerating HD/UF. Patient was extubated on 7/27/2023 with decision not to reintubate.     Summary:  Met with patient sister, Rani, at bedside. Agreeable to goals of care discussion at this time.  She states that after further discussion with family everybody is in agreement with transition to comfort focused care.  Transition to comfort care discussed in detail and answered several relevant questions from Rani regarding this.  Offered empathetic support to Rani during this difficult time.  Rani states that all of her family plans to arrive at 0800 tomorrow to visit with patient and transition to comfort focused care.    Plan:     1) Transition to comfort care tomorrow morning when all family is at bedside.  2) PC APRN remains available for continued goals of care discussion/support.    Thank you for allowing me the opportunity to participate in the care of Ms. English.     30 minutes spent  discussing advanced care planning, this time excludes any other billed services.    JADON Sotelo  Palliative Care Nurse Practitioner   [phone]     Lelo Phelps)

## 2023-09-13 ENCOUNTER — APPOINTMENT (OUTPATIENT)
Dept: ORTHOPEDIC SURGERY | Facility: CLINIC | Age: 63
End: 2023-09-13

## 2023-11-13 ENCOUNTER — RX RENEWAL (OUTPATIENT)
Age: 63
End: 2023-11-13

## 2024-03-28 ENCOUNTER — EMERGENCY (EMERGENCY)
Facility: HOSPITAL | Age: 64
LOS: 0 days | Discharge: HOME | End: 2024-03-28
Payer: MEDICARE

## 2024-03-28 VITALS
OXYGEN SATURATION: 99 % | DIASTOLIC BLOOD PRESSURE: 86 MMHG | RESPIRATION RATE: 18 BRPM | SYSTOLIC BLOOD PRESSURE: 150 MMHG | HEART RATE: 72 BPM

## 2024-03-28 VITALS
DIASTOLIC BLOOD PRESSURE: 71 MMHG | RESPIRATION RATE: 16 BRPM | SYSTOLIC BLOOD PRESSURE: 138 MMHG | HEIGHT: 71 IN | HEART RATE: 68 BPM | TEMPERATURE: 98 F | OXYGEN SATURATION: 95 % | WEIGHT: 242.95 LBS

## 2024-03-28 DIAGNOSIS — R40.0 SOMNOLENCE: ICD-10-CM

## 2024-03-28 DIAGNOSIS — I10 ESSENTIAL (PRIMARY) HYPERTENSION: ICD-10-CM

## 2024-03-28 DIAGNOSIS — J44.9 CHRONIC OBSTRUCTIVE PULMONARY DISEASE, UNSPECIFIED: ICD-10-CM

## 2024-03-28 DIAGNOSIS — Z63.8 OTHER SPECIFIED PROBLEMS RELATED TO PRIMARY SUPPORT GROUP: ICD-10-CM

## 2024-03-28 DIAGNOSIS — T40.2X1A POISONING BY OTHER OPIOIDS, ACCIDENTAL (UNINTENTIONAL), INITIAL ENCOUNTER: ICD-10-CM

## 2024-03-28 DIAGNOSIS — S60.511A ABRASION OF RIGHT HAND, INITIAL ENCOUNTER: Chronic | ICD-10-CM

## 2024-03-28 DIAGNOSIS — Z98.890 OTHER SPECIFIED POSTPROCEDURAL STATES: Chronic | ICD-10-CM

## 2024-03-28 LAB
ALBUMIN SERPL ELPH-MCNC: 3.5 G/DL — SIGNIFICANT CHANGE UP (ref 3.3–5)
ALP SERPL-CCNC: 56 U/L — SIGNIFICANT CHANGE UP (ref 40–120)
ALT FLD-CCNC: 18 U/L — SIGNIFICANT CHANGE UP (ref 12–78)
ANION GAP SERPL CALC-SCNC: 4 MMOL/L — LOW (ref 5–17)
AST SERPL-CCNC: 14 U/L — LOW (ref 15–37)
BASOPHILS # BLD AUTO: 0.03 K/UL — SIGNIFICANT CHANGE UP (ref 0–0.2)
BASOPHILS NFR BLD AUTO: 0.4 % — SIGNIFICANT CHANGE UP (ref 0–2)
BILIRUB SERPL-MCNC: 0.2 MG/DL — SIGNIFICANT CHANGE UP (ref 0.2–1.2)
BUN SERPL-MCNC: 20 MG/DL — SIGNIFICANT CHANGE UP (ref 7–23)
CALCIUM SERPL-MCNC: 9 MG/DL — SIGNIFICANT CHANGE UP (ref 8.5–10.1)
CHLORIDE SERPL-SCNC: 108 MMOL/L — SIGNIFICANT CHANGE UP (ref 96–108)
CO2 SERPL-SCNC: 29 MMOL/L — SIGNIFICANT CHANGE UP (ref 22–31)
CREAT SERPL-MCNC: 0.85 MG/DL — SIGNIFICANT CHANGE UP (ref 0.5–1.3)
EGFR: 98 ML/MIN/1.73M2 — SIGNIFICANT CHANGE UP
EOSINOPHIL # BLD AUTO: 0.07 K/UL — SIGNIFICANT CHANGE UP (ref 0–0.5)
EOSINOPHIL NFR BLD AUTO: 0.9 % — SIGNIFICANT CHANGE UP (ref 0–6)
GLUCOSE SERPL-MCNC: 106 MG/DL — HIGH (ref 70–99)
HCT VFR BLD CALC: 42.7 % — SIGNIFICANT CHANGE UP (ref 39–50)
HGB BLD-MCNC: 14 G/DL — SIGNIFICANT CHANGE UP (ref 13–17)
IMM GRANULOCYTES NFR BLD AUTO: 0.4 % — SIGNIFICANT CHANGE UP (ref 0–0.9)
LACTATE SERPL-SCNC: 1.2 MMOL/L — SIGNIFICANT CHANGE UP (ref 0.7–2)
LYMPHOCYTES # BLD AUTO: 0.9 K/UL — LOW (ref 1–3.3)
LYMPHOCYTES # BLD AUTO: 11.3 % — LOW (ref 13–44)
MAGNESIUM SERPL-MCNC: 2.3 MG/DL — SIGNIFICANT CHANGE UP (ref 1.6–2.6)
MCHC RBC-ENTMCNC: 29.6 PG — SIGNIFICANT CHANGE UP (ref 27–34)
MCHC RBC-ENTMCNC: 32.8 G/DL — SIGNIFICANT CHANGE UP (ref 32–36)
MCV RBC AUTO: 90.3 FL — SIGNIFICANT CHANGE UP (ref 80–100)
MONOCYTES # BLD AUTO: 0.25 K/UL — SIGNIFICANT CHANGE UP (ref 0–0.9)
MONOCYTES NFR BLD AUTO: 3.1 % — SIGNIFICANT CHANGE UP (ref 2–14)
NEUTROPHILS # BLD AUTO: 6.71 K/UL — SIGNIFICANT CHANGE UP (ref 1.8–7.4)
NEUTROPHILS NFR BLD AUTO: 83.9 % — HIGH (ref 43–77)
NRBC # BLD: 0 /100 WBCS — SIGNIFICANT CHANGE UP (ref 0–0)
PLATELET # BLD AUTO: 174 K/UL — SIGNIFICANT CHANGE UP (ref 150–400)
POTASSIUM SERPL-MCNC: 4.2 MMOL/L — SIGNIFICANT CHANGE UP (ref 3.5–5.3)
POTASSIUM SERPL-SCNC: 4.2 MMOL/L — SIGNIFICANT CHANGE UP (ref 3.5–5.3)
PROT SERPL-MCNC: 6.7 GM/DL — SIGNIFICANT CHANGE UP (ref 6–8.3)
RBC # BLD: 4.73 M/UL — SIGNIFICANT CHANGE UP (ref 4.2–5.8)
RBC # FLD: 13.1 % — SIGNIFICANT CHANGE UP (ref 10.3–14.5)
SODIUM SERPL-SCNC: 141 MMOL/L — SIGNIFICANT CHANGE UP (ref 135–145)
TROPONIN I, HIGH SENSITIVITY RESULT: 38.6 NG/L — SIGNIFICANT CHANGE UP
TROPONIN I, HIGH SENSITIVITY RESULT: 39.8 NG/L — SIGNIFICANT CHANGE UP
WBC # BLD: 7.99 K/UL — SIGNIFICANT CHANGE UP (ref 3.8–10.5)
WBC # FLD AUTO: 7.99 K/UL — SIGNIFICANT CHANGE UP (ref 3.8–10.5)

## 2024-03-28 PROCEDURE — 99284 EMERGENCY DEPT VISIT MOD MDM: CPT

## 2024-03-28 RX ORDER — KETOROLAC TROMETHAMINE 30 MG/ML
15 SYRINGE (ML) INJECTION ONCE
Refills: 0 | Status: DISCONTINUED | OUTPATIENT
Start: 2024-03-28 | End: 2024-03-28

## 2024-03-28 RX ORDER — ROSUVASTATIN CALCIUM 5 MG/1
1 TABLET ORAL
Refills: 0 | DISCHARGE

## 2024-03-28 RX ORDER — GABAPENTIN 400 MG/1
1 CAPSULE ORAL
Refills: 0 | DISCHARGE

## 2024-03-28 RX ORDER — VALSARTAN 80 MG/1
0 TABLET ORAL
Refills: 0 | DISCHARGE

## 2024-03-28 RX ORDER — ACETAMINOPHEN 500 MG
1000 TABLET ORAL ONCE
Refills: 0 | Status: COMPLETED | OUTPATIENT
Start: 2024-03-28 | End: 2024-03-28

## 2024-03-28 RX ORDER — ASPIRIN/CALCIUM CARB/MAGNESIUM 324 MG
1 TABLET ORAL
Refills: 0 | DISCHARGE

## 2024-03-28 RX ORDER — OXYCODONE AND ACETAMINOPHEN 5; 325 MG/1; MG/1
1 TABLET ORAL
Refills: 0 | DISCHARGE

## 2024-03-28 RX ADMIN — Medication 15 MILLIGRAM(S): at 18:38

## 2024-03-28 RX ADMIN — Medication 400 MILLIGRAM(S): at 18:36

## 2024-03-28 SDOH — SOCIAL STABILITY - SOCIAL INSECURITY: OTHER SPECIFIED PROBLEMS RELATED TO PRIMARY SUPPORT GROUP: Z63.8

## 2024-03-28 NOTE — ED ADULT TRIAGE NOTE - CHIEF COMPLAINT QUOTE
BIBEMS from [facility] found slumped over in his car as per EMS pt awaken with painful stimuli IV placed and  EKG clean , pt arrive in triage AAO x 3 with 18g Iv to his Right ac . pt reports being on oxy for pain management and had taken 2 pills for the pain s/p his pain management visit.  placed for EKG

## 2024-03-28 NOTE — ED PROVIDER NOTE - NSFOLLOWUPINSTRUCTIONS_ED_ALL_ED_FT
You were seen in the ED after and opioid overdose.   You were given a Narcan rescue kit. Please use as directed.     Opioid Safety    WHAT YOU NEED TO KNOW:    An opioid medicine is used to treat pain. Examples are oxycodone, morphine, fentanyl, or codeine. Pain control and management may help you rest, heal, and return to your daily activities. You and your family will receive information about how to manage your pain at home. The instructions will include what to do if you have side effects as your pain is managed. You will get information on how to handle opioid medicine safely. You will also get suggestions on how to control pain without opioids. It is important to follow all instructions so your pain is managed effectively.    DISCHARGE INSTRUCTIONS:    Call your local emergency number (911 in the [place]), or have someone else call if:    You have a seizure.    You cannot be woken.    You have trouble staying awake and your breathing is slow or shallow.    Your speech is slurred, or you are confused.    You are dizzy or stumble when you walk.  Call your doctor, or have someone close to you call if:    You are extremely drowsy, or you have trouble staying awake or speaking.    You have pale or clammy skin.    You have blue fingernails or lips.    Your heartbeat is slower than normal.    You cannot stop vomiting.    You have questions or concerns about your condition or care.  Use opioids safely:    Take prescribed opioids exactly as directed. Opioids come with directions based on the kind and how it is given. Talk to your healthcare provider or a pharmacist if you have any questions. Do not take more than the recommended amount. Too much can cause a life-threatening overdose. Do not continue to take it after your pain stops. You may develop tolerance. This means you keep needing higher doses to get the same effect. You may also develop opioid use disorder. This means you are not able to control your opioid use.    Do not give opioids to others or take opioids that belong to someone else. The kind or amount one person takes may not be right for another. The person you share them with may also be taking medicines that do not mix with opioids. He or she may drink alcohol or use other drugs that can cause life-threatening problems when mixed with opioids.    Do not mix opioids with other medicines or alcohol. The combination can cause an overdose, or cause you to stop breathing. Alcohol, sleeping pills, and medicines such as antihistamines can make you sleepy. A combination with opioids can lead to a coma.    Do not drive or operate heavy machinery after you use an opioid. You may feel drowsy or have trouble concentrating. You can injure yourself or others if you drive or use heavy machinery when you are not alert. Your provider or pharmacist can tell you how long to wait after a dose before you do these activities.    Talk to your healthcare provider if you have any side effects. Side effects include nausea, sleepiness, itching, and trouble thinking clearly. Your provider may need to make changes to the kind or amount of opioid you are taking. He or she can also help you find ways to prevent or relieve side effects.  Manage constipation: Constipation is the most common side effect of opioid medicine. Constipation is when you have hard, dry bowel movements, or you go longer than usual between bowel movements. Tell your healthcare provider about all changes in your bowel movements while you are taking opioids. He or she may recommend laxative medicine to help you have a bowel movement. He or she may also change the kind of opioid you are taking, or change when you take it. The following are more ways you can prevent or relieve constipation:    Drink liquids as directed. You may need to drink extra liquids to help soften and move your bowels. Ask how much liquid to drink each day and which liquids are best for you.    Eat high-fiber foods. This may help decrease constipation by adding bulk to your bowel movements. High-fiber foods include fruits, vegetables, whole-grain breads and cereals, and beans. Your healthcare provider or dietitian can help you create a high-fiber meal plan. Your provider may also recommend a fiber supplement if you cannot get enough fiber from food.        Exercise regularly. Regular physical activity can help stimulate your intestines. Walking is a good exercise to prevent or relieve constipation. Ask which exercises are best for you.  Walking for Exercise      Schedule a time each day to have a bowel movement. This may help train your body to have regular bowel movements. Bend forward while you are on the toilet to help move the bowel movement out. Sit on the toilet for at least 10 minutes, even if you do not have a bowel movement.  Store opioids safely:    Store opioids where others cannot easily get them. Keep them in a locked cabinet or secure area. Do not keep them in a purse or other bag you carry with you. A person may be looking for something else and find the opioids.    Make sure opioids are stored out of the reach of children. A child can easily overdose on opioids. Opioids may look like candy to a small child.  The best way to dispose of opioids: The laws vary by country and area. In the [place], the best way is to return the opioids through a take-back program. This program is offered by the [place] Drug Enforcement Agency (SHILPA). The following are options for using the program:    Take the opioids to a SHILPA collection site. The site is often a law enforcement center. Call your local law enforcement center for scheduled take-back days in your area. You will be given information on where to go if the collection site is in a different location.    Take the opioids to an approved pharmacy or hospital. A pharmacy or hospital may be set up as a collection site. You will need to ask if it is a SHILPA collection site if you were not directed there. A pharmacy or doctor's office may not be able to take back opioids unless it is a SHILPA site.    Use a mail-back system. This means you are given containers to put the opioids into. You will then mail them in the containers.    Use a take-back drop box. This is a place to leave the opioids at any time. People and animals will not be able to get into the box. Your local law enforcement agency can tell you where to find a drop box in your area.  Other safe ways to dispose of opioids: The medicine may come with disposal instructions. The instructions may vary depending on the brand of medicine you are using. Instructions may come in a Medication Guide, but not every medicine has one. You may instead get instructions from your pharmacy or doctor. Follow instructions carefully. The following are general guidelines to follow:    Find out if you can flush the opioid. Some opioids can be flushed down the toilet or poured into the sink. You will need to contact authorities in your area to see if this is an option for you. The FDA also offers a list of medicines that are safe to flush down the toilet. You can check the list if you cannot get the information for your local area.    Ask your waste management company about rules for putting opioids in the trash. The company will be able to give you specific directions. Scratch out personal information on the original medicine label so it cannot be read. Then put it in the trash. Do not label the trash or put any information on it about the opioids. It should look like regular household trash so no one is tempted to look for the opioids. Keep the trash out of the reach of children and animals. Always make sure trash is secure.    Talk to officials if you live in a facility. If you live in a nursing home or assisted living center, talk to an official. The person will know the rules for your area.  Other ways to manage pain:    Ask your healthcare provider about non-opioid medicines to control pain. Some medicines may even work better than opioids, depending on the cause of your pain. Nonprescription medicines include NSAIDs (such as ibuprofen) and acetaminophen. Prescription medicines include muscle relaxers, antidepressants, and steroids.    Pain may be managed without any medicines. Some ways to relieve pain include massage, aromatherapy, or meditation. Physical or occupational therapy may also help.  Follow up with your doctor or pain specialist as directed: You may need to have your dose adjusted. Your doctor or pain specialist can also help you find ways to manage pain without opioids. Write down your questions so you remember to ask them during your visits.    For more information:    Drug Enforcement Administration  [address] [place], [place] [id number]  Phone: 1-863.637.8955  Web Address: https://www.deadiversion.[facility].gov/drug_disposal/  [place] Food and Drug Administration  [address] [place] [address]  Phone: 1-542.998.8777  Web Address: http://www.fda.gov

## 2024-03-28 NOTE — ED PROVIDER NOTE - PATIENT PORTAL LINK FT
You can access the FollowMyHealth Patient Portal offered by [facility] by registering at the following website: [email or web address]. By joining [employer]’s FollowMyHealth portal, you will also be able to view your health information using other applications (apps) compatible with our system.

## 2024-03-28 NOTE — ED PROVIDER NOTE - OBJECTIVE STATEMENT
64 y/o M with pmhx of chronic pain (on 10mg oxycodone q4 hrs), HTN, COPD BIB EMS after being found slumped over in his car. Pt was in his car in parking lot of his pain management doctor's office. Pt states that he fell asleep. Per EMS report, pt was arousable to painful stimuli. Pt states he last took oxycodone 10mg x 2 this morning around 0500 and normally takes 2 tablets in the morning. However, pt states he has been taking more of his oxycodone over the last few days due to feeling increased stress (has to take care of elderly father, wife who was diagnosed with lung ca and grandkids). Denies fever/chills, CP, SOB, abd pain, N/V, palpitations, recent episodes of syncope or near syncope, hx of seizures, HA, SI/HI.

## 2024-03-28 NOTE — ED ADULT NURSE REASSESSMENT NOTE - NS ED NURSE REASSESS COMMENT FT1
Received report from SCOTT Aparicio. Identified pt and introduced myself as RN. Pt awake, a&ox4. Respirations are even and unlabored on room air. Naloxone kit provided and teaching provided. Safety and fall precautions in place.

## 2024-03-28 NOTE — ED ADULT NURSE REASSESSMENT NOTE - NS ED NURSE REASSESS COMMENT FT1
Pt AOX3 with steady gait. Pt reports no acute distress at this time. Pt accept the d/c from the MD and Iv hep lock removed

## 2024-03-28 NOTE — ED PROVIDER NOTE - PROGRESS NOTE DETAILS
Supervising Statement (KANDICE Chavarria): I have personally seen and examined this patient.  I have fully participated in the care of this patient. I have reviewed all pertinent clinical information, including history, physical exam, plan and the ACP Fellow's note and agree except as noted.    Patient seen at the beginning with myself and KANDICE Amos, patient reporting increasing oxycodone doses for increased pain recently, denies SI/H auditory or visual hallucinations, low suspicion for syncope vs seizure, RR and O2 stable, pupils small but reactive, still sleepy in ER, will continue to monitor until more alert and clinically sober.

## 2024-03-28 NOTE — ED PROVIDER NOTE - PHYSICAL EXAMINATION
General: Somnolent, arousable to verbal stimuli. Pt in no acute distress.  Head: Normocephalic, atraumatic  Eyes: PERRLA, no conjunctival injection, no scleral icterus, EOMI  Neck: Soft and supple, full ROM without pain, no midline tenderness  Cardiac: Regular rate and regular rhythm, no murmurs  Resp: Unlabored respiratory effort, lungs CTAB, speaking in full sentences, no wheezes  Abd: Soft, non-tender, non-distended, no guarding or rebound tenderness  MSK: Spine midline and non-tender  Skin: Warm and dry, no rashes/abrasions/lacerations  Neuro: AO x 3, moves all extremities symmetrically, Motor strength 5/5 bilaterally UE and LE, sensation grossly intact  Psych: Mood and affect normal.

## 2024-03-28 NOTE — ED PROVIDER NOTE - CLINICAL SUMMARY MEDICAL DECISION MAKING FREE TEXT BOX
Patient currently afebrile, hemodynamically stable, spO2 100%. Physical exam with somnolent appearing male, in no acute respiratory distress. Airway patent. Heart and lung sounds normal. Based on history and physical, differentials include but are not limited to overdose vs syncope vs seizure. Plan to assess patient for acute pathology as listed above with labs. Will f/u results. If labs within normal limits, d/c home with education on medication dosing and pain management.

## 2024-03-28 NOTE — ED ADULT NURSE NOTE - OBJECTIVE STATEMENT
received er bed m25 biba after being found asleep in his car by police takes oxycontin 2 tabs at 5am also taking gabapentin under pain management for back and shoulder pains awake alert and cooperative at present though somewhat groggy at present oriented x 4

## 2024-03-28 NOTE — ED ADULT NURSE NOTE - NSFALLUNIVINTERV_ED_ALL_ED
Bed/Stretcher in lowest position, wheels locked, appropriate side rails in place/Call bell, personal items and telephone in reach/Instruct patient to call for assistance before getting out of bed/chair/stretcher/Non-slip footwear applied when patient is off stretcher/Smith to call system/Physically safe environment - no spills, clutter or unnecessary equipment/Purposeful proactive rounding/Room/bathroom lighting operational, light cord in reach

## 2024-03-28 NOTE — ED ADULT TRIAGE NOTE - WEIGHT IN LBS
Please notify pt of low Vit-D level. Recommend OTC Vit-D3 at 3000 units daily. We can re check in the fall.
242.9

## 2024-04-02 NOTE — ED PROVIDER NOTE - MEDICAL DECISION MAKING DETAILS
Spoke to pt and reminded him of his appointment and xr  Pt voiced understanding and call ended.       56 year old male presents with worsening left hand pain and swelling. Plan: X-Ray, pain control, hand surgery as needed, reassess.

## 2024-04-08 ENCOUNTER — RX RENEWAL (OUTPATIENT)
Age: 64
End: 2024-04-08

## 2024-04-08 RX ORDER — TAMSULOSIN HYDROCHLORIDE 0.4 MG/1
0.4 CAPSULE ORAL
Qty: 30 | Refills: 3 | Status: ACTIVE | COMMUNITY
Start: 2022-11-15 | End: 1900-01-01

## 2025-01-04 NOTE — ASSESSMENT
[FreeTextEntry1] :  Bilateral  X-Ray Examination of the KNEE (4 views): left knee severe lateral, right medial and patellofemoral degenerate changes.\par \par - We discussed their diagnosis and treatment options at length including surgical and non-surgical options.\par - We will continue conservative treatment with activity modification, PT, icing, weight loss, and anti-inflammatory medications.\par - The patient was provided with a PT prescription to work on ROM, hip ER/abductors strengthening, quad/hamstring stretches and strengthening, and other exercises \par - The patient was advised to let pain guide the gradual advancement of activities. \par - We also discussed the possible of a corticosteroid injection in order to help decrease inflammation and pain so that they can perform better therapy.\par - The risks, benefits, and alternatives to corticosteroid injection were reviewed with the patient and they wished to proceed with this treatment course. \par - Follow up with joint repclemtm doctor
fair balance